# Patient Record
Sex: MALE | Race: WHITE | NOT HISPANIC OR LATINO | Employment: FULL TIME | URBAN - METROPOLITAN AREA
[De-identification: names, ages, dates, MRNs, and addresses within clinical notes are randomized per-mention and may not be internally consistent; named-entity substitution may affect disease eponyms.]

---

## 2019-07-16 ENCOUNTER — OFFICE VISIT (OUTPATIENT)
Dept: URGENT CARE | Facility: MEDICAL CENTER | Age: 39
End: 2019-07-16
Payer: COMMERCIAL

## 2019-07-16 ENCOUNTER — HOSPITAL ENCOUNTER (OUTPATIENT)
Dept: RADIOLOGY | Facility: MEDICAL CENTER | Age: 39
End: 2019-07-16
Attending: FAMILY MEDICINE
Payer: COMMERCIAL

## 2019-07-16 VITALS
HEIGHT: 70 IN | DIASTOLIC BLOOD PRESSURE: 82 MMHG | TEMPERATURE: 98.5 F | SYSTOLIC BLOOD PRESSURE: 126 MMHG | HEART RATE: 87 BPM | WEIGHT: 200 LBS | BODY MASS INDEX: 28.63 KG/M2 | OXYGEN SATURATION: 97 %

## 2019-07-16 DIAGNOSIS — R05.9 COUGH: ICD-10-CM

## 2019-07-16 DIAGNOSIS — J06.9 VIRAL URI WITH COUGH: ICD-10-CM

## 2019-07-16 LAB
FLUAV+FLUBV AG SPEC QL IA: NEGATIVE
INT CON NEG: NORMAL
INT CON POS: NORMAL

## 2019-07-16 PROCEDURE — 99203 OFFICE O/P NEW LOW 30 MIN: CPT | Mod: 25 | Performed by: FAMILY MEDICINE

## 2019-07-16 PROCEDURE — 87804 INFLUENZA ASSAY W/OPTIC: CPT | Performed by: FAMILY MEDICINE

## 2019-07-16 PROCEDURE — 71046 X-RAY EXAM CHEST 2 VIEWS: CPT

## 2019-07-16 PROCEDURE — 94640 AIRWAY INHALATION TREATMENT: CPT | Performed by: FAMILY MEDICINE

## 2019-07-16 PROCEDURE — 93000 ELECTROCARDIOGRAM COMPLETE: CPT | Performed by: FAMILY MEDICINE

## 2019-07-16 RX ORDER — ALBUTEROL SULFATE 90 UG/1
2 AEROSOL, METERED RESPIRATORY (INHALATION) EVERY 4 HOURS PRN
Qty: 1 INHALER | Refills: 0 | Status: SHIPPED | OUTPATIENT
Start: 2019-07-16 | End: 2024-02-16

## 2019-07-16 RX ORDER — ALBUTEROL SULFATE 2.5 MG/3ML
2.5 SOLUTION RESPIRATORY (INHALATION) ONCE
Status: COMPLETED | OUTPATIENT
Start: 2019-07-16 | End: 2019-07-16

## 2019-07-16 RX ADMIN — ALBUTEROL SULFATE 2.5 MG: 2.5 SOLUTION RESPIRATORY (INHALATION) at 09:39

## 2019-07-16 NOTE — PATIENT INSTRUCTIONS
"Use inhaler as needed  Continue symptomatic care  Follow up if not significantly improved as expected, sooner if any worsening or new symptoms      Upper Respiratory Infection, Adult  Most upper respiratory infections (URIs) are caused by a virus. A URI affects the nose, throat, and upper air passages. The most common type of URI is often called \"the common cold.\"  Follow these instructions at home:  · Take medicines only as told by your doctor.  · Gargle warm saltwater or take cough drops to comfort your throat as told by your doctor.  · Use a warm mist humidifier or inhale steam from a shower to increase air moisture. This may make it easier to breathe.  · Drink enough fluid to keep your pee (urine) clear or pale yellow.  · Eat soups and other clear broths.  · Have a healthy diet.  · Rest as needed.  · Go back to work when your fever is gone or your doctor says it is okay.  ¨ You may need to stay home longer to avoid giving your URI to others.  ¨ You can also wear a face mask and wash your hands often to prevent spread of the virus.  · Use your inhaler more if you have asthma.  · Do not use any tobacco products, including cigarettes, chewing tobacco, or electronic cigarettes. If you need help quitting, ask your doctor.  Contact a doctor if:  · You are getting worse, not better.  · Your symptoms are not helped by medicine.  · You have chills.  · You are getting more short of breath.  · You have brown or red mucus.  · You have yellow or brown discharge from your nose.  · You have pain in your face, especially when you bend forward.  · You have a fever.  · You have puffy (swollen) neck glands.  · You have pain while swallowing.  · You have white areas in the back of your throat.  Get help right away if:  · You have very bad or constant:  ¨ Headache.  ¨ Ear pain.  ¨ Pain in your forehead, behind your eyes, and over your cheekbones (sinus pain).  ¨ Chest pain.  · You have long-lasting (chronic) lung disease and any of " the following:  ¨ Wheezing.  ¨ Long-lasting cough.  ¨ Coughing up blood.  ¨ A change in your usual mucus.  · You have a stiff neck.  · You have changes in your:  ¨ Vision.  ¨ Hearing.  ¨ Thinking.  ¨ Mood.  This information is not intended to replace advice given to you by your health care provider. Make sure you discuss any questions you have with your health care provider.  Document Released: 06/05/2009 Document Revised: 08/20/2017 Document Reviewed: 03/25/2015  Elsevier Interactive Patient Education © 2017 [employer]

## 2019-07-16 NOTE — PROGRESS NOTES
"Subjective:      Keo Almodovar is a 38 y.o. male who presents with Chest Pain (x2days full body aches, chest pain on deep inhalation causing cough, SOB, fatigue, heart keeps ponding off and on)            This is a new problem.  38-year-old otherwise healthy non-smoker presenting for 24-hour history of cough pain with deep breathing, fever and chills (fever up to 102 yesterday) at times felt his heart was racing (not today), and feels like he cannot take a deep breath but denies any wheezing.  Denies any chest pain or pressure per se.  No travel history exposure to pneumonia.  Otherwise healthy.  He is a golfer.  Denies any history of CAD.  No recent exposures to ill contacts, pneumonia.  He has taken some Tylenol for his fever.  Currently he has body aches.        Review of Systems   All other systems reviewed and are negative.         Objective:     /82   Pulse 87   Temp 36.9 °C (98.5 °F) (Temporal)   Ht 1.778 m (5' 10\")   Wt 90.7 kg (200 lb)   SpO2 97%   BMI 28.70 kg/m²      Physical Exam   Constitutional: He is oriented to person, place, and time. He appears well-developed and well-nourished.  Non-toxic appearance. No distress.   HENT:   Head: Normocephalic and atraumatic.   Right Ear: Tympanic membrane, external ear and ear canal normal.   Left Ear: Tympanic membrane, external ear and ear canal normal.   Nose: No rhinorrhea.   Mouth/Throat: Oropharynx is clear and moist. No oral lesions. No trismus in the jaw. No uvula swelling. No oropharyngeal exudate, posterior oropharyngeal edema, posterior oropharyngeal erythema or tonsillar abscesses.   Eyes: Conjunctivae are normal.   Neck: Neck supple.   Cardiovascular: Normal rate and regular rhythm.  Exam reveals no gallop and no friction rub.    No murmur heard.  Pulmonary/Chest: Effort normal. No stridor. No respiratory distress. He has no wheezes. He has no rhonchi. He has no rales.   Prolonged expiration but no wheezing or rhonchi heard.  " After the neb patient felt a lot better taking deep breaths   Lymphadenopathy:     He has no cervical adenopathy.   Neurological: He is alert and oriented to person, place, and time.   Skin: Skin is warm. No rash noted. He is not diaphoretic. No erythema. No pallor.   Psychiatric: He has a normal mood and affect.        EKG shows normal sinus rhythm with no acute changes.    Chest x-ray is negative for acute abnormalities on my read.    Rapid flu is negative       Assessment/Plan:     ASSESSMENT:PLAN:  1. Viral URI with cough    2. Cough  - EKG - Clinic Performed  - POCT Influenza A/B  - albuterol (PROVENTIL) 2.5mg/3ml nebulizer solution 2.5 mg; 3 mL by Nebulization route Once.  - DX-CHEST-2 VIEWS; Future  - albuterol 108 (90 Base) MCG/ACT Aero Soln inhalation aerosol; Inhale 2 Puffs by mouth every four hours as needed (wheezing).  Dispense: 1 Inhaler; Refill: 0      We discussed his normal EKG and chest x-ray, also negative flu test  History and physical suggestive of a viral illness and we discussed symptomatic treatment.  He did feel significantly better after the albuterol nebulizer he was given.  We discussed using albuterol inhaler every 4 hours as needed.  Resting and hydrating and over-the-counter medication for fever and cough discussed.  Plan per orders and instructions  Warning signs reviewed

## 2019-08-06 ENCOUNTER — TELEPHONE (OUTPATIENT)
Dept: MEDICAL GROUP | Facility: MEDICAL CENTER | Age: 39
End: 2019-08-06

## 2019-08-06 NOTE — TELEPHONE ENCOUNTER
Patient left a voicemail wondering if he should have a follow up appt. I left him a voicemail to call and schedule an appt if he is still having symptoms at [phone].

## 2021-07-30 ENCOUNTER — APPOINTMENT (RX ONLY)
Dept: URBAN - METROPOLITAN AREA CLINIC 31 | Facility: CLINIC | Age: 41
Setting detail: DERMATOLOGY
End: 2021-07-30

## 2021-07-30 DIAGNOSIS — L65.9 NONSCARRING HAIR LOSS, UNSPECIFIED: ICD-10-CM

## 2021-07-30 DIAGNOSIS — Z71.89 OTHER SPECIFIED COUNSELING: ICD-10-CM

## 2021-07-30 PROCEDURE — ? PRESCRIPTION

## 2021-07-30 PROCEDURE — 99202 OFFICE O/P NEW SF 15 MIN: CPT

## 2021-07-30 PROCEDURE — ? ADDITIONAL NOTES

## 2021-07-30 PROCEDURE — ? COUNSELING

## 2021-07-30 RX ORDER — FINASTERIDE 1 MG/1
TABLET, FILM COATED ORAL
Qty: 30 | Refills: 11 | Status: ERX | COMMUNITY
Start: 2021-07-30

## 2021-07-30 RX ADMIN — FINASTERIDE 1: 1 TABLET, FILM COATED ORAL at 00:00

## 2021-07-30 ASSESSMENT — LOCATION ZONE DERM: LOCATION ZONE: SCALP

## 2021-07-30 ASSESSMENT — LOCATION SIMPLE DESCRIPTION DERM
LOCATION SIMPLE: SCALP
LOCATION SIMPLE: RIGHT SCALP

## 2021-07-30 ASSESSMENT — LOCATION DETAILED DESCRIPTION DERM
LOCATION DETAILED: LEFT CENTRAL PARIETAL SCALP
LOCATION DETAILED: RIGHT CENTRAL FRONTAL SCALP

## 2021-07-30 NOTE — HPI: RASH
What Type Of Note Output Would You Prefer (Optional)?: Standard Output
Is This A New Presentation, Or A Follow-Up?: Rash
Additional History: Patient was being treated by previous dermatologist for rash. Which rash is well controlled now

## 2021-07-30 NOTE — PROCEDURE: ADDITIONAL NOTES
Additional Notes: Patient came in today for skin care products. \\nUse moisturiser with SPF of 30 or greater daily.\\nAdvise patient to use La Roche Posay Anthelios for face and neck sunblock Coppertone Waterbabies for body.\\nRecommend patient to use TSAL shampoo to wash face, as salicylic acid has been helpful for his acne.
Detail Level: Simple
Render Risk Assessment In Note?: no

## 2022-10-12 ENCOUNTER — APPOINTMENT (RX ONLY)
Dept: URBAN - METROPOLITAN AREA CLINIC 31 | Facility: CLINIC | Age: 42
Setting detail: DERMATOLOGY
End: 2022-10-12

## 2022-10-12 DIAGNOSIS — L259 CONTACT DERMATITIS AND OTHER ECZEMA, UNSPECIFIED CAUSE: ICD-10-CM

## 2022-10-12 DIAGNOSIS — L65.9 NONSCARRING HAIR LOSS, UNSPECIFIED: ICD-10-CM

## 2022-10-12 PROBLEM — L23.9 ALLERGIC CONTACT DERMATITIS, UNSPECIFIED CAUSE: Status: ACTIVE | Noted: 2022-10-12

## 2022-10-12 PROCEDURE — 99213 OFFICE O/P EST LOW 20 MIN: CPT

## 2022-10-12 PROCEDURE — ? COUNSELING

## 2022-10-12 PROCEDURE — ? PRESCRIPTION

## 2022-10-12 PROCEDURE — ? ORDER FOR PATCH TESTING

## 2022-10-12 RX ORDER — TRIAMCINOLONE ACETONIDE 1 MG/G
CREAM TOPICAL BID
Qty: 80 | Refills: 3 | Status: ERX | COMMUNITY
Start: 2022-10-12

## 2022-10-12 RX ORDER — HYDROCORTISONE 25 MG/G
CREAM TOPICAL BID
Qty: 28 | Refills: 3 | Status: ERX | COMMUNITY
Start: 2022-10-12

## 2022-10-12 RX ORDER — FINASTERIDE 1 MG/1
TABLET, FILM COATED ORAL
Qty: 30 | Refills: 11 | Status: ERX

## 2022-10-12 RX ADMIN — HYDROCORTISONE 1: 25 CREAM TOPICAL at 00:00

## 2022-10-12 RX ADMIN — TRIAMCINOLONE ACETONIDE 1: 1 CREAM TOPICAL at 00:00

## 2022-10-12 ASSESSMENT — LOCATION SIMPLE DESCRIPTION DERM
LOCATION SIMPLE: LEFT ANTERIOR NECK
LOCATION SIMPLE: LEFT UPPER ARM
LOCATION SIMPLE: RIGHT UPPER ARM
LOCATION SIMPLE: LEFT CHEEK
LOCATION SIMPLE: RIGHT CHEEK
LOCATION SIMPLE: RIGHT SCALP
LOCATION SIMPLE: SCALP

## 2022-10-12 ASSESSMENT — LOCATION DETAILED DESCRIPTION DERM
LOCATION DETAILED: LEFT CENTRAL PARIETAL SCALP
LOCATION DETAILED: LEFT ANTERIOR PROXIMAL UPPER ARM
LOCATION DETAILED: RIGHT CENTRAL MALAR CHEEK
LOCATION DETAILED: LEFT CENTRAL MALAR CHEEK
LOCATION DETAILED: RIGHT CENTRAL FRONTAL SCALP
LOCATION DETAILED: LEFT INFERIOR ANTERIOR NECK
LOCATION DETAILED: RIGHT ANTERIOR PROXIMAL UPPER ARM

## 2022-10-12 ASSESSMENT — LOCATION ZONE DERM
LOCATION ZONE: ARM
LOCATION ZONE: NECK
LOCATION ZONE: SCALP
LOCATION ZONE: FACE

## 2022-10-12 NOTE — HPI: RASH
Is The Patient Presenting As Previously Scheduled?: Yes
How Severe Is Your Rash?: moderate
Is This A New Presentation, Or A Follow-Up?: Rash
Additional History: Pt has rash about 3 times per year, always in these locations

## 2022-10-12 NOTE — PROCEDURE: ORDER FOR PATCH TESTING
Location Patches Should Be Applied: Back
Counseling: I discussed the timing of the procedure and ensured the patient understands that this test requires multiple visits. No topical steroids applied should be applied to the patch testing location and no oral prednisone for two week prior to the test. While the patches are in place they should be kept dry which will limit bathing, swimming an exercise. I also explained that it is common for testing to be negative and this doesn't mean there isn't a allergic reaction occurring. During the testing itching is common.
Patch Test Reading Schedule: First Reading at 48 hours, Second Reading at 72 hours and Third Reading on Day 7
Detail Level: Simple
Patch Test To Be Applied: [employer] 80 Series

## 2022-11-15 ENCOUNTER — APPOINTMENT (RX ONLY)
Dept: URBAN - METROPOLITAN AREA CLINIC 4 | Facility: CLINIC | Age: 42
Setting detail: DERMATOLOGY
End: 2022-11-15

## 2022-11-15 DIAGNOSIS — L259 CONTACT DERMATITIS AND OTHER ECZEMA, UNSPECIFIED CAUSE: ICD-10-CM

## 2022-11-15 PROBLEM — L30.9 DERMATITIS, UNSPECIFIED: Status: ACTIVE | Noted: 2022-11-15

## 2022-11-15 PROCEDURE — ? PATCH TESTING

## 2022-11-15 PROCEDURE — 95044 PATCH/APPLICATION TESTS: CPT

## 2022-11-15 ASSESSMENT — LOCATION SIMPLE DESCRIPTION DERM: LOCATION SIMPLE: UPPER BACK

## 2022-11-15 ASSESSMENT — LOCATION DETAILED DESCRIPTION DERM: LOCATION DETAILED: INFERIOR THORACIC SPINE

## 2022-11-15 ASSESSMENT — LOCATION ZONE DERM: LOCATION ZONE: TRUNK

## 2022-11-15 NOTE — HPI: TESTING (PATCH TESTING)
Has Your Rash Been Biopsied Before?: has not been biopsied previously
Have You Had Previous Patch Testing In The Past?: none
How Severe Is Your Rash At Its Worst?: moderate
Additional History: History of food allergies, sensitivities to deodorant, fragrances, anti wrinkle treatments.
Who Is Your Referring Doctor?: Rahul Marquez
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

## 2022-11-15 NOTE — PROCEDURE: PATCH TESTING
Consent: Verbal consent obtained, risks reviewed including but not limited to rash, itching, allergic reaction, systemic rash, remote possibility of anaphylaxis to allergen.
Post-Care Instructions: I reviewed with the patient in detail post-care instructions. Patient should not sweat, pick at, or get the patches wet for 48 hours.
Detail Level: Zone
Number Of Patches (Maximum Allowable Per Dos By Cms Is 90): 80

## 2022-11-17 ENCOUNTER — APPOINTMENT (RX ONLY)
Dept: URBAN - METROPOLITAN AREA CLINIC 4 | Facility: CLINIC | Age: 42
Setting detail: DERMATOLOGY
End: 2022-11-17

## 2022-11-17 DIAGNOSIS — L259 CONTACT DERMATITIS AND OTHER ECZEMA, UNSPECIFIED CAUSE: ICD-10-CM

## 2022-11-17 PROBLEM — L30.9 DERMATITIS, UNSPECIFIED: Status: ACTIVE | Noted: 2022-11-17

## 2022-11-17 PROCEDURE — ? CORE ACDS PATCH TEST READING

## 2022-11-17 PROCEDURE — 99212 OFFICE O/P EST SF 10 MIN: CPT

## 2022-11-17 NOTE — PROCEDURE: CORE ACDS PATCH TEST READING
Name Of Allergen 69: cetylstearyalcohol
Name Of Allergen 66: dimethylol dihydroxyethyleneurea
Name Of Allergen 5: DMDM hydantoin
Allergen 30 Reaction: no reaction
Show Negative Results In The Note?: Yes
Name Of Allergen 17: methylchloroisothiazolinone/methylisothiazolinone
Name Of Allergen 21: formaldehyde
Name Of Allergen 15: carba mix
Name Of Allergen 36: lidocaine-hci
Name Of Allergen 14: bisphenol A epoxy resin
Name Of Allergen 55: 2-hydroxy-4-methoxybenzophenone
Name Of Allergen 48: cinnamic aldehyde
Detail Level: Zone
Name Of Allergen 42: dimethylaminopropylamine
Name Of Allergen 4: potassium dichromate
Name Of Allergen 34: fragrance mix 2
Name Of Allergen 35: disperse blue mix 124/106
Name Of Allergen 8: paraben mix B
Name Of Allergen 53: propolis
Name Of Allergen 26: benzocaine
Name Of Allergen 47: lavandula angustifolia oil
Name Of Allergen 38: iodopropyynyl betaine
Name Of Allergen 1: nickel sulfate hexahydrate
Name Of Allergen 24: thiuram mix A
Name Of Allergen 18: quaternium 15
Name Of Allergen 40: cocamidopropyl betaine
Name Of Allergen 16: black rubber mix
Name Of Allergen 7: colophony
Name Of Allergen 43: 2-hydroxyethyl-methacrylate
Name Of Allergen 30: budesonide
Name Of Allergen 76: disperse orange-3
Allergen 48 Reaction: 1+
Name Of Allergen 70: ethyl hexyl glycerol
What Reading Time Point?: 48 hour
Name Of Allergen 62: sodium benzoate
Name Of Allergen 73: amidoamine
Name Of Allergen 46: methyl methacrylate
Name Of Allergen 59: 4-chloro-3-cresol
Name Of Allergen 64: cananga odorata
Name Of Allergen 58: cocunut diethanolamide
Name Of Allergen 2: amerchol L101
Name Of Allergen 67: sorbitan sesquioleate
Name Of Allergen 6: fragrance mix
Name Of Allergen 60: benzalkonium chloride
Name Of Allergen 39: polymyxin B sulfate
Name Of Allergen 80: benzyl alcohol
Name Of Allergen 54: 4-chloro-3,5-xylenol
Name Of Allergen 41: mixed dialkyl thioureas
Name Of Allergen 13: petrolatum-tert- butylphenol formaldehyde resin
Name Of Allergen 20: petrolatum-phenylenediamine
Name Of Allergen 57: sesquiterpenelactone mix
Name Of Allergen 56: tosylamide formaldehyde resin
Name Of Allergen 28: gold sodium thiosulfate
Name Of Allergen 74: ethyl cyanoacrylate
Name Of Allergen 11: ethylenediamine dihydrochloride
Name Of Allergen 68: 1,3-diphenylguanidine
Name Of Allergen 51: tea tree oil, oxidized
Name Of Allergen 52: chlorhexidine diclugonate
Name Of Allergen 75: phenoxyethanol
Name Of Allergen 19: methyldibromo glutaronitrile
Name Of Allergen 65: compositae mix
Name Of Allergen 61: benzophenone chloride
Name Of Allergen 49: dl alpha tocopherol
Name Of Allergen 32: 2-mercaptobenzothiazole
Name Of Allergen 29: imidazolidinyl urea
Name Of Allergen 3: neomycin sulfate
Name Of Allergen 71: triamcinalone acetonide
Name Of Allergen 79: 2-ethylhexyl-4-methoxycinnamate
Name Of Allergen 27: tixocortol-21-pivalate
Name Of Allergen 25: diazolidinyl urea
Name Of Allergen 22: mercapto mix A
Name Of Allergen 77: benzoic acid
Name Of Allergen 44: oleamidopropyl dimethylamine
Name Of Allergen 31: hydrocortisone-17-butyrate
Name Of Allergen 33: bacitracin
Name Of Allergen 72: clobetasol-17-propionate
Name Of Allergen 78: butylhydroxytoluene
Name Of Allergen 23: 2-bromo-2-nitropropane-1,3-diol
Name Of Allergen 45: decyl glucoside
Name Of Allergen 10: balsam of peru
Number Of Patches Read: 80
Name Of Allergen 12: cobalt 2 chloride hexahydrate
Name Of Allergen 9: methylisothiazolinone
Name Of Allergen 50: ethyl acrylate
Name Of Allergen 37: propylene glycol
Name Of Allergen 63: sorbic acid

## 2022-11-22 ENCOUNTER — APPOINTMENT (RX ONLY)
Dept: URBAN - METROPOLITAN AREA CLINIC 4 | Facility: CLINIC | Age: 42
Setting detail: DERMATOLOGY
End: 2022-11-22

## 2022-11-22 DIAGNOSIS — L259 CONTACT DERMATITIS AND OTHER ECZEMA, UNSPECIFIED CAUSE: ICD-10-CM

## 2022-11-22 PROBLEM — L30.9 DERMATITIS, UNSPECIFIED: Status: ACTIVE | Noted: 2022-11-22

## 2022-11-22 PROCEDURE — 99212 OFFICE O/P EST SF 10 MIN: CPT

## 2022-11-22 PROCEDURE — ? CORE ACDS PATCH TEST READING

## 2022-11-22 PROCEDURE — ? ADDITIONAL NOTES

## 2022-11-22 NOTE — HPI: TESTING (PATCH TESTING READING, DISCUSSION OF RESULTS)
How Severe Is Your Rash?: moderate
What Patch Testing Have You Undergone?: Core ACDS Recommended Series
What Reading Is This?: 168 hour patch test reading

## 2022-11-22 NOTE — PROCEDURE: ADDITIONAL NOTES
Additional Notes: Completed patch testing on this patient who has had a rash variably involving the neck and arms.  When we saw him, he had a rash predominantly involving the anterior and lateral sides of the neck.\\n\\nHe had 3 positive reactions.  \\n Allergens Showing 3+ Reactions:  propolis\\nAllergens Showing 2+ Reactions:\\n1. paraben mix B\\n2. methylchloroisothiazolinone/methylisothiazolinone\\n\\n\\Pat of these allergens may be relevant to his rash.  He has the allergen data sheets and the [employer] irineo which should help him avoid purchasing products that contain these allergens.\\n\\n\\nHe is scheduled to follow-up with the referring dermatologist, Dr. Camilo, in 2 months.  Hopefully by that time he will notice significant improvement due to effective allergen avoidance.
Detail Level: Simple
Render Risk Assessment In Note?: yes

## 2022-11-22 NOTE — PROCEDURE: CORE ACDS PATCH TEST READING
Allergen 96 Reaction: no reaction
Name Of Allergen 36: lidocaine-hci
Name Of Allergen 46: methyl methacrylate
Name Of Allergen 4: potassium dichromate
What Reading Time Point?: 168 hour
Name Of Allergen 53: propolis
Name Of Allergen 55: 2-hydroxy-4-methoxybenzophenone
Name Of Allergen 49: dl alpha tocopherol
Name Of Allergen 51: tea tree oil, oxidized
Name Of Allergen 68: 1,3-diphenylguanidine
Name Of Allergen 57: sesquiterpenelactone mix
Name Of Allergen 17: methylchloroisothiazolinone/methylisothiazolinone
Show Allergen Counseling In The Note?: Yes
Name Of Allergen 2: amerchol L101
Allergen 8 Reaction: 2+
Name Of Allergen 44: oleamidopropyl dimethylamine
Name Of Allergen 21: formaldehyde
Name Of Allergen 18: quaternium 15
Name Of Allergen 71: triamcinalone acetonide
Name Of Allergen 58: cocunut diethanolamide
Name Of Allergen 25: diazolidinyl urea
Name Of Allergen 41: mixed dialkyl thioureas
Name Of Allergen 61: benzophenone chloride
Name Of Allergen 70: ethyl hexyl glycerol
Name Of Allergen 40: cocamidopropyl betaine
Name Of Allergen 72: clobetasol-17-propionate
Name Of Allergen 9: methylisothiazolinone
Name Of Allergen 10: balsam of peru
Name Of Allergen 33: bacitracin
Name Of Allergen 73: amidoamine
Name Of Allergen 6: fragrance mix
Name Of Allergen 32: 2-mercaptobenzothiazole
Name Of Allergen 37: propylene glycol
Name Of Allergen 63: sorbic acid
Name Of Allergen 34: fragrance mix 2
Name Of Allergen 79: 2-ethylhexyl-4-methoxycinnamate
Name Of Allergen 43: 2-hydroxyethyl-methacrylate
Name Of Allergen 48: cinnamic aldehyde
Name Of Allergen 42: dimethylaminopropylamine
Number Of Patches Read: 80
Name Of Allergen 45: decyl glucoside
Name Of Allergen 59: 4-chloro-3-cresol
Name Of Allergen 69: cetylstearyalcohol
Name Of Allergen 14: bisphenol A epoxy resin
Name Of Allergen 15: carba mix
Name Of Allergen 20: petrolatum-phenylenediamine
Name Of Allergen 74: ethyl cyanoacrylate
Name Of Allergen 64: cananga odorata
Name Of Allergen 8: paraben mix B
Name Of Allergen 23: 2-bromo-2-nitropropane-1,3-diol
Name Of Allergen 11: ethylenediamine dihydrochloride
Name Of Allergen 60: benzalkonium chloride
Name Of Allergen 80: benzyl alcohol
Name Of Allergen 13: petrolatum-tert- butylphenol formaldehyde resin
Name Of Allergen 29: imidazolidinyl urea
Name Of Allergen 1: nickel sulfate hexahydrate
Name Of Allergen 66: dimethylol dihydroxyethyleneurea
Allergen 53 Reaction: 3+
Name Of Allergen 24: thiuram mix A
Name Of Allergen 75: phenoxyethanol
Name Of Allergen 77: benzoic acid
Name Of Allergen 31: hydrocortisone-17-butyrate
Name Of Allergen 39: polymyxin B sulfate
Name Of Allergen 16: black rubber mix
Name Of Allergen 19: methyldibromo glutaronitrile
Name Of Allergen 62: sodium benzoate
Name Of Allergen 7: colophony
Name Of Allergen 65: compositae mix
Name Of Allergen 12: cobalt 2 chloride hexahydrate
Name Of Allergen 30: budesonide
Name Of Allergen 56: tosylamide formaldehyde resin
Name Of Allergen 26: benzocaine
Name Of Allergen 78: butylhydroxytoluene
Name Of Allergen 47: lavandula angustifolia oil
Detail Level: Zone
Name Of Allergen 52: chlorhexidine diclugonate
Name Of Allergen 5: DMDM hydantoin
Name Of Allergen 28: gold sodium thiosulfate
Name Of Allergen 54: 4-chloro-3,5-xylenol
Name Of Allergen 76: disperse orange-3
Name Of Allergen 27: tixocortol-21-pivalate
Name Of Allergen 22: mercapto mix A
Name Of Allergen 67: sorbitan sesquioleate
Name Of Allergen 35: disperse blue mix 124/106
Name Of Allergen 50: ethyl acrylate
Name Of Allergen 38: iodopropyynyl betaine
Name Of Allergen 3: neomycin sulfate

## 2023-12-19 ENCOUNTER — RX ONLY (OUTPATIENT)
Age: 43
Setting detail: RX ONLY
End: 2023-12-19

## 2023-12-19 RX ORDER — FINASTERIDE 1 MG/1
TABLET, FILM COATED ORAL
Qty: 30 | Refills: 0 | Status: ERX

## 2024-01-25 ENCOUNTER — APPOINTMENT (RX ONLY)
Dept: URBAN - METROPOLITAN AREA CLINIC 4 | Facility: CLINIC | Age: 44
Setting detail: DERMATOLOGY
End: 2024-01-25

## 2024-01-25 DIAGNOSIS — B36.0 PITYRIASIS VERSICOLOR: ICD-10-CM

## 2024-01-25 DIAGNOSIS — L259 CONTACT DERMATITIS AND OTHER ECZEMA, UNSPECIFIED CAUSE: ICD-10-CM | Status: INADEQUATELY CONTROLLED

## 2024-01-25 DIAGNOSIS — L65.9 NONSCARRING HAIR LOSS, UNSPECIFIED: ICD-10-CM

## 2024-01-25 PROBLEM — L23.9 ALLERGIC CONTACT DERMATITIS, UNSPECIFIED CAUSE: Status: ACTIVE | Noted: 2024-01-25

## 2024-01-25 PROBLEM — L30.8 OTHER SPECIFIED DERMATITIS: Status: ACTIVE | Noted: 2024-01-25

## 2024-01-25 PROCEDURE — ? ADDITIONAL NOTES

## 2024-01-25 PROCEDURE — 99214 OFFICE O/P EST MOD 30 MIN: CPT

## 2024-01-25 PROCEDURE — ? KOH PREP

## 2024-01-25 PROCEDURE — ? DIAGNOSIS COMMENT

## 2024-01-25 PROCEDURE — ? WOOD'S LAMP

## 2024-01-25 PROCEDURE — ? PRESCRIPTION

## 2024-01-25 PROCEDURE — ? COUNSELING

## 2024-01-25 RX ORDER — FINASTERIDE 1 MG/1
TABLET, FILM COATED ORAL
Qty: 30 | Refills: 11 | Status: ERX

## 2024-01-25 ASSESSMENT — LOCATION ZONE DERM
LOCATION ZONE: ARM
LOCATION ZONE: SCALP
LOCATION ZONE: FACE
LOCATION ZONE: TRUNK

## 2024-01-25 ASSESSMENT — LOCATION SIMPLE DESCRIPTION DERM
LOCATION SIMPLE: SCALP
LOCATION SIMPLE: GROIN
LOCATION SIMPLE: RIGHT UPPER ARM
LOCATION SIMPLE: RIGHT SCALP
LOCATION SIMPLE: UPPER BACK
LOCATION SIMPLE: ABDOMEN
LOCATION SIMPLE: SUPERIOR FOREHEAD
LOCATION SIMPLE: CHEST

## 2024-01-25 ASSESSMENT — LOCATION DETAILED DESCRIPTION DERM
LOCATION DETAILED: EPIGASTRIC SKIN
LOCATION DETAILED: STERNUM
LOCATION DETAILED: RIGHT INGUINAL CREASE
LOCATION DETAILED: RIGHT CENTRAL FRONTAL SCALP
LOCATION DETAILED: LEFT CENTRAL PARIETAL SCALP
LOCATION DETAILED: LEFT INGUINAL CREASE
LOCATION DETAILED: SUPERIOR MID FOREHEAD
LOCATION DETAILED: RIGHT PROXIMAL POSTERIOR UPPER ARM
LOCATION DETAILED: SUPERIOR THORACIC SPINE

## 2024-01-25 NOTE — PROCEDURE: WOOD'S LAMP
Detail Level: Simple
Wood's Lamp Text: A Wood's Lamp was used to illuminate areas of the skin with a black light. The light was held over the suspected areas in a darkened room.
Response To Light: positive for enhanced pigmentation

## 2024-01-25 NOTE — PROCEDURE: ADDITIONAL NOTES
Render Risk Assessment In Note?: no
Detail Level: Simple
Additional Notes: [facility] Patch Testing Nov 2022: \\nAllergens Showing 3+ Reactions:  propolis\\nAllergens Showing 2+ Reactions: paraben mix B, methylchloroisothiazolinone / methylisothiazolinone

## 2024-01-25 NOTE — HPI: RASH
How Severe Is Your Rash?: moderate
Is This A New Presentation, Or A Follow-Up?: Follow Up Rash
Additional History: Patient has confirmed allergic contact allergens that he hasn’t been monitoring in his products that he uses.

## 2024-01-26 ENCOUNTER — RX ONLY (OUTPATIENT)
Age: 44
Setting detail: RX ONLY
End: 2024-01-26

## 2024-01-26 RX ORDER — FINASTERIDE 1 MG/1
1 TABLET, FILM COATED ORAL QD
Qty: 90 | Refills: 3 | Status: ERX

## 2024-01-26 RX ORDER — DESONIDE 0.5 MG/G
1 OINTMENT TOPICAL BID
Qty: 60 | Refills: 1 | Status: ERX | COMMUNITY
Start: 2024-01-26

## 2024-01-26 RX ORDER — KETOCONAZOLE 20 MG/ML
1 SHAMPOO, SUSPENSION TOPICAL BIW
Qty: 120 | Refills: 3 | Status: ERX | COMMUNITY
Start: 2024-01-26

## 2024-01-26 RX ORDER — TRIAMCINOLONE ACETONIDE 1 MG/G
1 CREAM TOPICAL BID
Qty: 80 | Refills: 0 | Status: ERX

## 2024-01-26 RX ADMIN — DESONIDE 1: 0.5 OINTMENT TOPICAL at 00:00

## 2024-01-26 RX ADMIN — KETOCONAZOLE 1: 20 SHAMPOO, SUSPENSION TOPICAL at 00:00

## 2024-02-16 ENCOUNTER — OFFICE VISIT (OUTPATIENT)
Dept: URGENT CARE | Facility: CLINIC | Age: 44
End: 2024-02-16
Payer: COMMERCIAL

## 2024-02-16 VITALS
TEMPERATURE: 97.8 F | WEIGHT: 200 LBS | HEIGHT: 70 IN | HEART RATE: 80 BPM | SYSTOLIC BLOOD PRESSURE: 118 MMHG | OXYGEN SATURATION: 97 % | BODY MASS INDEX: 28.63 KG/M2 | RESPIRATION RATE: 16 BRPM | DIASTOLIC BLOOD PRESSURE: 64 MMHG

## 2024-02-16 DIAGNOSIS — H10.33 ACUTE CONJUNCTIVITIS OF BOTH EYES, UNSPECIFIED ACUTE CONJUNCTIVITIS TYPE: ICD-10-CM

## 2024-02-16 DIAGNOSIS — J06.9 URI WITH COUGH AND CONGESTION: ICD-10-CM

## 2024-02-16 PROCEDURE — 99203 OFFICE O/P NEW LOW 30 MIN: CPT | Performed by: NURSE PRACTITIONER

## 2024-02-16 PROCEDURE — 3074F SYST BP LT 130 MM HG: CPT | Performed by: NURSE PRACTITIONER

## 2024-02-16 PROCEDURE — 3078F DIAST BP <80 MM HG: CPT | Performed by: NURSE PRACTITIONER

## 2024-02-16 RX ORDER — FINASTERIDE 1 MG/1
1 TABLET, FILM COATED ORAL
COMMUNITY

## 2024-02-16 RX ORDER — POLYMYXIN B SULFATE AND TRIMETHOPRIM 1; 10000 MG/ML; [USP'U]/ML
1 SOLUTION OPHTHALMIC EVERY 4 HOURS
Qty: 10 ML | Refills: 0 | Status: SHIPPED | OUTPATIENT
Start: 2024-02-16 | End: 2024-02-23

## 2024-02-16 RX ORDER — LISINOPRIL 10 MG/1
TABLET ORAL
COMMUNITY
Start: 2024-01-18

## 2024-02-16 ASSESSMENT — ENCOUNTER SYMPTOMS
BLURRED VISION: 0
COUGH: 1
EYE DISCHARGE: 1
EYE PAIN: 0
SHORTNESS OF BREATH: 0
EYE ITCHING: 0
SORE THROAT: 1
CONSTITUTIONAL NEGATIVE: 1
FEVER: 0
EYE REDNESS: 1
CHILLS: 0

## 2024-02-16 ASSESSMENT — VISUAL ACUITY: OU: 1

## 2024-02-16 NOTE — PROGRESS NOTES
Subjective:     Keo Almodovar is a 43 y.o. male who presents for Pharyngitis (Sx x a week ), Cough (Sx x a week ago ), and Conjunctivitis (Sx x this morning)       Pharyngitis   This is a new problem. The problem has been unchanged. Associated symptoms include congestion and coughing. Pertinent negatives include no shortness of breath.   Cough  This is a new problem. The problem has been unchanged. Associated symptoms include eye redness and a sore throat. Pertinent negatives include no chills, fever or shortness of breath.   Eye Problem   Both eyes are affected. This is a new problem. The current episode started today. The problem has been gradually worsening. There was no injury mechanism. The patient is experiencing no pain. Associated symptoms include an eye discharge (Crusty) and eye redness. Pertinent negatives include no blurred vision, fever or itching. He has tried nothing for the symptoms.     Patient reports cough, congestion, and sore throat for 1 week.  Today, woke up with bilateral crusty eye discharge.  Eyes are also red.  Denies injury.    Reports wife had similar symptoms a month ago.  Was treated with antibiotic eyedrops and symptoms resolved.    Kids go to  and have history of pink eye. No sx at this time.    Review of Systems   Constitutional: Negative.  Negative for chills, fever and malaise/fatigue.   HENT:  Positive for congestion and sore throat.    Eyes:  Positive for discharge (Crusty) and redness. Negative for blurred vision, pain and itching.   Respiratory:  Positive for cough. Negative for shortness of breath.    All other systems reviewed and are negative.    Refer to HPI for additional details.    During this visit, appropriate PPE was worn, and hand hygiene was performed.    PMH:  has no past medical history on file.    MEDS:   Current Outpatient Medications:     lisinopril (PRINIVIL) 10 MG Tab, , Disp: , Rfl:     finasteride (PROPECIA) 1 MG tablet, Take 1 mg by  "mouth every day., Disp: , Rfl:     polymixin-trimethoprim (POLYTRIM) 97899-4.1 UNIT/ML-% Solution, Administer 1 Drop into both eyes every 4 hours for 7 days., Disp: 10 mL, Rfl: 0    ALLERGIES:   Allergies   Allergen Reactions    [place] (Diagnostic)     Eggs     Peanut (Diagnostic)      SURGHX: History reviewed. No pertinent surgical history.  SOCHX:  reports that he has never smoked. He has never used smokeless tobacco. He reports current alcohol use.    FH: Per HPI as applicable/pertinent.      Objective:     /64 (BP Location: Left arm, Patient Position: Sitting, BP Cuff Size: Large adult)   Pulse 80   Temp 36.6 °C (97.8 °F) (Temporal)   Resp 16   Ht 1.778 m (5' 10\")   Wt 90.7 kg (200 lb)   SpO2 97%   BMI 28.70 kg/m²     Physical Exam  Nursing note reviewed.   Constitutional:       General: He is not in acute distress.     Appearance: He is well-developed. He is not ill-appearing or toxic-appearing.   HENT:      Nose: Congestion present.      Mouth/Throat:      Mouth: Mucous membranes are moist.      Comments: PND  Eyes:      General: Vision grossly intact.         Right eye: No discharge.         Left eye: No discharge.      Extraocular Movements: Extraocular movements intact.      Conjunctiva/sclera:      Right eye: Right conjunctiva is injected.      Left eye: Left conjunctiva is injected.      Pupils: Pupils are equal, round, and reactive to light.   Neck:      Trachea: Phonation normal.   Cardiovascular:      Rate and Rhythm: Normal rate and regular rhythm.      Heart sounds: Normal heart sounds.   Pulmonary:      Effort: Pulmonary effort is normal. No respiratory distress.      Breath sounds: Normal breath sounds. No stridor. No decreased breath sounds, wheezing, rhonchi or rales.   Musculoskeletal:         General: No deformity. Normal range of motion.      Cervical back: Normal range of motion.   Skin:     General: Skin is warm and dry.      Coloration: Skin is not pale.   Neurological:      " Mental Status: He is alert and oriented to person, place, and time.      Motor: No weakness.   Psychiatric:         Behavior: Behavior normal. Behavior is cooperative.       Assessment/Plan:     1. Acute conjunctivitis of both eyes, unspecified acute conjunctivitis type  - polymixin-trimethoprim (POLYTRIM) 10779-4.1 UNIT/ML-% Solution; Administer 1 Drop into both eyes every 4 hours for 7 days.  Dispense: 10 mL; Refill: 0    2. URI with cough and congestion    Discussed likely concurrent self-limiting viral etiology and expected course and duration of illness. Vital signs stable, afebrile, no acute distress at this time.    Concern for new onset bacterial conjunctivitis.  Rx as above sent electronically.  Advised of infectious nature of conjunctivitis. Avoid rubbing eyes. Perform frequent hand hygiene.     Emphasize supportive measures and symptom management with over-the-counter medication as needed such as DayQuil/NyQuil.    Standard precautions/mask/wash hands.    Monitor. Return precautions advised.     Differential diagnosis, natural history, supportive care, over-the-counter symptom management per 's instructions, close monitoring, and indications for immediate follow-up discussed.     All questions answered. Patient agrees with the plan of care.    Discharge summary provided via [employer].

## 2024-02-21 ENCOUNTER — OFFICE VISIT (OUTPATIENT)
Dept: URGENT CARE | Facility: CLINIC | Age: 44
End: 2024-02-21
Payer: COMMERCIAL

## 2024-02-21 VITALS
TEMPERATURE: 97.8 F | BODY MASS INDEX: 27.92 KG/M2 | OXYGEN SATURATION: 95 % | WEIGHT: 195 LBS | DIASTOLIC BLOOD PRESSURE: 70 MMHG | HEART RATE: 68 BPM | RESPIRATION RATE: 16 BRPM | HEIGHT: 70 IN | SYSTOLIC BLOOD PRESSURE: 128 MMHG

## 2024-02-21 DIAGNOSIS — J32.9 BACTERIAL SINUSITIS: ICD-10-CM

## 2024-02-21 DIAGNOSIS — B96.89 BACTERIAL SINUSITIS: ICD-10-CM

## 2024-02-21 DIAGNOSIS — R05.9 COUGH, UNSPECIFIED TYPE: ICD-10-CM

## 2024-02-21 PROCEDURE — 3078F DIAST BP <80 MM HG: CPT | Performed by: PHYSICIAN ASSISTANT

## 2024-02-21 PROCEDURE — 3074F SYST BP LT 130 MM HG: CPT | Performed by: PHYSICIAN ASSISTANT

## 2024-02-21 PROCEDURE — 99213 OFFICE O/P EST LOW 20 MIN: CPT | Performed by: PHYSICIAN ASSISTANT

## 2024-02-21 RX ORDER — AMOXICILLIN AND CLAVULANATE POTASSIUM 875; 125 MG/1; MG/1
1 TABLET, FILM COATED ORAL 2 TIMES DAILY
Qty: 14 TABLET | Refills: 0 | Status: SHIPPED | OUTPATIENT
Start: 2024-02-21 | End: 2024-02-28

## 2024-02-21 RX ORDER — BENZONATATE 100 MG/1
100 CAPSULE ORAL 3 TIMES DAILY PRN
Qty: 30 CAPSULE | Refills: 0 | Status: SHIPPED | OUTPATIENT
Start: 2024-02-21 | End: 2024-03-02

## 2024-02-21 RX ORDER — PREDNISONE 10 MG/1
40 TABLET ORAL DAILY
Qty: 20 TABLET | Refills: 0 | Status: SHIPPED | OUTPATIENT
Start: 2024-02-21 | End: 2024-02-26

## 2024-02-21 RX ORDER — DEXTROMETHORPHAN HYDROBROMIDE AND PROMETHAZINE HYDROCHLORIDE 15; 6.25 MG/5ML; MG/5ML
5 SYRUP ORAL EVERY 6 HOURS PRN
Qty: 118 ML | Refills: 0 | Status: SHIPPED | OUTPATIENT
Start: 2024-02-21 | End: 2024-02-28

## 2024-02-21 ASSESSMENT — ENCOUNTER SYMPTOMS
MYALGIAS: 0
NAUSEA: 0
SPUTUM PRODUCTION: 1
ABDOMINAL PAIN: 0
SINUS PAIN: 1
FEVER: 0
CONSTIPATION: 0
SHORTNESS OF BREATH: 0
COUGH: 1
SORE THROAT: 1
HEADACHES: 0
EYE PAIN: 0
CHILLS: 0
DIARRHEA: 0
VOMITING: 0

## 2024-02-21 NOTE — PROGRESS NOTES
"Subjective:   Keo Almodovar is a 43 y.o. male who presents for Cough and Pharyngitis (Sx x on going , has been seen for same cc )      43-year-old male returns to urgent care, has been sick for around 2 weeks and over the last several days has had a persistent cough as well as a sore throat.  The cough is occasionally dry, intermittently with sputum production.  He feels slightly more rundown.  He is more postnasal drip and mucus production.  Was seen for pinkeye several days ago which is resolved    Review of Systems   Constitutional:  Positive for malaise/fatigue. Negative for chills and fever.   HENT:  Positive for congestion, sinus pain and sore throat. Negative for ear pain.    Eyes:  Negative for pain.   Respiratory:  Positive for cough and sputum production. Negative for shortness of breath.    Cardiovascular:  Negative for chest pain.   Gastrointestinal:  Negative for abdominal pain, constipation, diarrhea, nausea and vomiting.   Genitourinary:  Negative for dysuria.   Musculoskeletal:  Negative for myalgias.   Skin:  Negative for rash.   Neurological:  Negative for headaches.       Medications, Allergies, and current problem list reviewed today in Epic.     Objective:     /70 (BP Location: Left arm, Patient Position: Sitting, BP Cuff Size: Adult)   Pulse 68   Temp 36.6 °C (97.8 °F) (Temporal)   Resp 16   Ht 1.778 m (5' 10\")   Wt 88.5 kg (195 lb)   SpO2 95%     Physical Exam  Vitals reviewed.   Constitutional:       Appearance: Normal appearance.   HENT:      Head: Normocephalic and atraumatic.      Right Ear: Tympanic membrane, ear canal and external ear normal.      Left Ear: Tympanic membrane, ear canal and external ear normal.      Nose: Congestion and rhinorrhea present.      Mouth/Throat:      Mouth: Mucous membranes are moist.      Pharynx: No oropharyngeal exudate or posterior oropharyngeal erythema.      Comments: POST NASAL DRIP  Eyes:      Conjunctiva/sclera: Conjunctivae " normal.   Cardiovascular:      Rate and Rhythm: Normal rate and regular rhythm.   Pulmonary:      Effort: Pulmonary effort is normal.      Breath sounds: Normal breath sounds.   Musculoskeletal:      Cervical back: Normal range of motion.   Lymphadenopathy:      Cervical: No cervical adenopathy.   Skin:     General: Skin is warm and dry.      Capillary Refill: Capillary refill takes less than 2 seconds.   Neurological:      Mental Status: He is alert and oriented to person, place, and time.         Assessment/Plan:     Diagnosis and associated orders:     1. Bacterial sinusitis  amoxicillin-clavulanate (AUGMENTIN) 875-125 MG Tab      2. Cough, unspecified type  predniSONE (DELTASONE) 10 MG Tab    promethazine-dextromethorphan (PROMETHAZINE-DM) 6.25-15 MG/5ML syrup    benzonatate (TESSALON) 100 MG Cap         Comments/MDM:     Trial of prednisone cough suppressants.  Patient warned about sedating potential of the cough suppressant and to use carefully.  Recommend antihistamine and nasal steroid.  He could consider starting the antibiotic currently but he may just require use of the oral glucocorticoids to resolve his symptoms.  If failing to improve after 48 to 72 hours or worsening at any point recommend he trial the antibiotic.         Differential diagnosis, natural history, supportive care, and indications for immediate follow-up discussed.    Advised the patient to follow-up with the primary care physician for recheck, reevaluation, and consideration of further management.    Please note that this dictation was created using voice recognition software. I have made a reasonable attempt to correct obvious errors, but I expect that there are errors of grammar and possibly content that I did not discover before finalizing the note.    This note was electronically signed by Yohannes Winston PA-C

## 2024-03-22 ENCOUNTER — OFFICE VISIT (OUTPATIENT)
Dept: URGENT CARE | Facility: CLINIC | Age: 44
End: 2024-03-22
Payer: COMMERCIAL

## 2024-03-22 VITALS
BODY MASS INDEX: 27.2 KG/M2 | TEMPERATURE: 98.2 F | SYSTOLIC BLOOD PRESSURE: 108 MMHG | DIASTOLIC BLOOD PRESSURE: 66 MMHG | RESPIRATION RATE: 18 BRPM | HEART RATE: 72 BPM | WEIGHT: 190 LBS | HEIGHT: 70 IN | OXYGEN SATURATION: 97 %

## 2024-03-22 DIAGNOSIS — H61.20 CERUMEN IN AUDITORY CANAL ON EXAMINATION: ICD-10-CM

## 2024-03-22 DIAGNOSIS — J30.89 ENVIRONMENTAL AND SEASONAL ALLERGIES: Primary | ICD-10-CM

## 2024-03-22 PROCEDURE — 99213 OFFICE O/P EST LOW 20 MIN: CPT | Mod: 25

## 2024-03-22 PROCEDURE — 3078F DIAST BP <80 MM HG: CPT

## 2024-03-22 PROCEDURE — 3074F SYST BP LT 130 MM HG: CPT

## 2024-03-22 RX ORDER — DESONIDE 0.5 MG/G
OINTMENT TOPICAL
COMMUNITY
Start: 2024-01-26 | End: 2024-03-29

## 2024-03-22 RX ORDER — TRIAMCINOLONE ACETONIDE 1 MG/G
CREAM TOPICAL
COMMUNITY
Start: 2024-01-26 | End: 2024-03-29

## 2024-03-22 RX ORDER — TRIAMCINOLONE ACETONIDE 40 MG/ML
40 INJECTION, SUSPENSION INTRA-ARTICULAR; INTRAMUSCULAR ONCE
Status: COMPLETED | OUTPATIENT
Start: 2024-03-22 | End: 2024-03-22

## 2024-03-22 RX ORDER — KETOCONAZOLE 20 MG/ML
SHAMPOO TOPICAL
COMMUNITY
Start: 2024-01-27 | End: 2024-03-01

## 2024-03-22 RX ADMIN — TRIAMCINOLONE ACETONIDE 40 MG: 40 INJECTION, SUSPENSION INTRA-ARTICULAR; INTRAMUSCULAR at 18:31

## 2024-03-22 ASSESSMENT — ENCOUNTER SYMPTOMS
SORE THROAT: 0
DIARRHEA: 0
FEVER: 0
STRIDOR: 0
VOMITING: 0
ABDOMINAL PAIN: 0
EYE PAIN: 0
SPUTUM PRODUCTION: 0
PALPITATIONS: 0
SHORTNESS OF BREATH: 0
DIZZINESS: 0
CHILLS: 0
NAUSEA: 0
WHEEZING: 0
COUGH: 0
HEADACHES: 0
MYALGIAS: 0

## 2024-03-23 NOTE — PROGRESS NOTES
Subjective:   Keo Almodovar is a 43 y.o. male who presents for Seasonal Allergies (X2-3 days, watery eyes, and runny nose )          I introduced myself to the patient and informed them that I am a family nurse practitioner.    HPI:Keo is a 43-year-old male who comes in today c/o severe rhinitis, itchy watery eyes, sneezing. Onset was approximately 1 week ago.  Patient describes symptoms as constant.  He denies any other viral type symptoms.  He does endorse having severe seasonal allergies.  They describe the pain as none. Aggravating factors include going outside, especially when it is windy. Relieving factors include none. Treatments tried at home include Benadryl with poor effect. They describe their symptoms as moderate.      Review of Systems   Constitutional:  Negative for chills, fever and malaise/fatigue.   HENT:  Positive for congestion. Negative for ear pain and sore throat.         Positive for rhinitis and sneezing   Eyes:  Positive for discharge and redness. Negative for blurred vision and pain.   Respiratory:  Negative for cough, sputum production, shortness of breath, wheezing and stridor.    Cardiovascular:  Negative for chest pain and palpitations.   Gastrointestinal:  Negative for abdominal pain, diarrhea, nausea and vomiting.   Genitourinary:  Negative for dysuria.   Musculoskeletal:  Negative for myalgias.   Skin:  Negative for rash.   Neurological:  Negative for dizziness and headaches.       Medications: desonide  finasteride  lisinopril Tabs  triamcinolone acetonide  triamcinolone acetonide Crea     Allergies: [place] (diagnostic), Eggs, and Peanut (diagnostic)    Problem List: does not have a problem list on file.    Surgical History:  No past surgical history on file.    Past Social Hx:   reports that he has never smoked. He has never used smokeless tobacco. He reports current alcohol use. He reports that he does not use drugs.     Past Family Hx:   family history is  "not on file.     Problem list, medications, and allergies reviewed by myself today in Epic.   I have documented what I find to be significant in regards to past medical, social, family and surgical history  in my HPI or under PMH/PSH/FH review section, otherwise it is noncontributory     Objective:     /66   Pulse 72   Temp 36.8 °C (98.2 °F) (Temporal)   Resp 18   Ht 1.778 m (5' 10\")   Wt 86.2 kg (190 lb)   SpO2 97%   BMI 27.26 kg/m²     During this visit, appropriate PPE was worn, and hand hygiene was performed.    Physical Exam  Vitals reviewed.   Constitutional:       General: He is not in acute distress.     Appearance: Normal appearance. He is not ill-appearing or toxic-appearing.   HENT:      Head: Normocephalic and atraumatic.      Right Ear: Tympanic membrane, ear canal and external ear normal. There is no impacted cerumen.      Left Ear: Tympanic membrane, ear canal and external ear normal. There is no impacted cerumen.      Nose: Rhinorrhea present. No congestion. Rhinorrhea is clear.      Right Turbinates: Swollen and pale.      Left Turbinates: Swollen and pale.      Right Sinus: No maxillary sinus tenderness or frontal sinus tenderness.      Left Sinus: No maxillary sinus tenderness or frontal sinus tenderness.      Mouth/Throat:      Pharynx: Oropharynx is clear. No oropharyngeal exudate or posterior oropharyngeal erythema.   Eyes:      General: Lids are normal. Vision grossly intact. Gaze aligned appropriately. Allergic shiner present. No visual field deficit or scleral icterus.        Right eye: No foreign body or discharge.         Left eye: No foreign body or discharge.      Extraocular Movements: Extraocular movements intact.      Right eye: Normal extraocular motion.      Conjunctiva/sclera: Conjunctivae normal.      Pupils: Pupils are equal, round, and reactive to light.   Cardiovascular:      Rate and Rhythm: Normal rate and regular rhythm.      Heart sounds: Normal heart sounds. " No murmur heard.     No friction rub. No gallop.   Pulmonary:      Effort: Pulmonary effort is normal. No respiratory distress.      Breath sounds: Normal breath sounds. No stridor. No wheezing, rhonchi or rales.   Abdominal:      General: There is no distension.   Musculoskeletal:         General: Normal range of motion.      Cervical back: Normal range of motion. No rigidity.      Right lower leg: No edema.      Left lower leg: No edema.   Lymphadenopathy:      Cervical: No cervical adenopathy.   Skin:     General: Skin is warm and dry.      Coloration: Skin is not jaundiced.   Neurological:      General: No focal deficit present.      Mental Status: He is alert and oriented to person, place, and time. Mental status is at baseline.   Psychiatric:         Mood and Affect: Mood normal.         Behavior: Behavior normal.         Thought Content: Thought content normal.         Judgment: Judgment normal.         Assessment/Plan:     Diagnosis and associated orders:     1. Environmental and seasonal allergies  triamcinolone acetonide (Kenalog-40) injection 40 mg         Comments/MDM:     1. Environmental and seasonal allergies  Discussed with patient Dx,  DDx, management options (risks,benefits, and alternatives to planned treatment), natural progression.   Supportive care measures were discussed.   Questions were encouraged and answered.  Written information was provided and I did go over this with the patient in clinic today.  Discussed with patient Kenalog allergy shot in clinic to help alleviate symptoms, Flonase nasal spray and Zyrtec daily ongoing, instructed him regarding purpose, side effects, precautions.  States he has good understanding would like to go ahead with the Kenalog shot.  I did keep him in clinic for 10 minutes after the shot, he tolerated well with no adverse reactions.  Discussed with patient he should follow-up with PCP for further management of allergies, Kenalog shots may be administered 2-3  times per year for control of allergy symptoms.  Instructed patient regarding red flags and to return to urgent care prn if new or worsening sx or if there is no improvement in condition prn.    Advised the patient to follow-up with the primary care physician for recheck, reevaluation, and consideration of further management.  I did instruct patient regarding medications prescribed, purpose, side effects, precautions.  Instructed patient to get a pharmacy consult when picking up any prescribed medications.  Strict ER precautions discussed for any  chest pain, difficulty breathing, difficulty swallowing, wheezing, stridor, or drooling    Patient states they have good understanding and they are agreeable with the plan of care.     - triamcinolone acetonide (Kenalog-40) injection 40 mg    2. Cerumen in auditory canal on examination  Discussed with patient that there is some buildup of cerumen in bilateral ear canals, however I am able to see TMs which appear normal with good landmarks.  Discussed with him use of OTC Debrox 3-4 times a year to keep ear canals clear of cerumen.  He states he has good understanding.         Pt is clinically stable at today's acute urgent care visit. Vital signs are normal and reassuring.  No acute distress noted. Appropriate for outpatient management at this time.        I personally reviewed prior external notes and test results pertinent to today's visit.  I have independently reviewed and interpreted all diagnostics ordered during this urgent care acute visit.        Please note that this dictation was created using voice recognition software. I have made a reasonable attempt to correct obvious errors, but I expect that there are errors of grammar and possibly content that I did not discover before finalizing the note.    This note was electronically signed by Azam LUCERO, ABDIEL, JH, ARIN

## 2024-03-25 ASSESSMENT — VISUAL ACUITY: OU: 1

## 2024-03-25 ASSESSMENT — ENCOUNTER SYMPTOMS
EYE DISCHARGE: 1
BLURRED VISION: 0
EYE REDNESS: 1

## 2024-03-29 ENCOUNTER — OFFICE VISIT (OUTPATIENT)
Dept: URGENT CARE | Facility: CLINIC | Age: 44
End: 2024-03-29
Payer: COMMERCIAL

## 2024-03-29 VITALS
HEART RATE: 75 BPM | TEMPERATURE: 96 F | WEIGHT: 190 LBS | HEIGHT: 70 IN | SYSTOLIC BLOOD PRESSURE: 102 MMHG | BODY MASS INDEX: 27.2 KG/M2 | DIASTOLIC BLOOD PRESSURE: 74 MMHG | OXYGEN SATURATION: 98 % | RESPIRATION RATE: 16 BRPM

## 2024-03-29 DIAGNOSIS — J06.9 VIRAL URI WITH COUGH: ICD-10-CM

## 2024-03-29 DIAGNOSIS — J02.0 STREP PHARYNGITIS: Primary | ICD-10-CM

## 2024-03-29 LAB — S PYO DNA SPEC NAA+PROBE: DETECTED

## 2024-03-29 RX ORDER — AMOXICILLIN 500 MG/1
500 CAPSULE ORAL 2 TIMES DAILY
Qty: 20 CAPSULE | Refills: 0 | Status: SHIPPED | OUTPATIENT
Start: 2024-03-29 | End: 2024-04-08

## 2024-03-29 ASSESSMENT — ENCOUNTER SYMPTOMS
SWOLLEN GLANDS: 1
VOMITING: 0
SHORTNESS OF BREATH: 0
DIARRHEA: 0
FEVER: 0
WHEEZING: 0
SORE THROAT: 1
COUGH: 1
HEMOPTYSIS: 0
NAUSEA: 0

## 2024-03-29 NOTE — PROGRESS NOTES
Subjective:     Keo Almodovar is a 43 y.o. male who presents for Cough (X one week ) and Pharyngitis (X one week )      States he's had ongoing illness over the last month, that the current sympotms feel the same as previous with a prolonged cough and sore throat. His previous symptoms had resolved, stating they went to [place] and felt fine, and then became sick again upon returning back home. Sore throat is currently 2/10. Has taken a previously prescribed cough syrup x 3 night. His wife recently was positive for strep throat.    Cough  This is a new problem. The current episode started in the past 7 days. Associated symptoms include a sore throat. Pertinent negatives include no ear pain, fever, hemoptysis, shortness of breath or wheezing.   Pharyngitis   This is a new problem. The current episode started in the past 7 days. Associated symptoms include coughing and swollen glands. Pertinent negatives include no diarrhea, ear pain, shortness of breath or vomiting.       History reviewed. No pertinent past medical history.    History reviewed. No pertinent surgical history.    Social History     Socioeconomic History    Marital status:      Spouse name: Not on file    Number of children: Not on file    Years of education: Not on file    Highest education level: Not on file   Occupational History    Not on file   Tobacco Use    Smoking status: Never    Smokeless tobacco: Never   Vaping Use    Vaping Use: Never used   Substance and Sexual Activity    Alcohol use: Yes     Comment: 5    Drug use: Never    Sexual activity: Not on file   Other Topics Concern    Not on file   Social History Narrative    Not on file     Social Determinants of Health     Financial Resource Strain: Not on file   Food Insecurity: Not on file   Transportation Needs: Not on file   Physical Activity: Not on file   Stress: Not on file   Social Connections: Not on file   Intimate Partner Violence: Not on file   Housing  "Stability: Not on file        History reviewed. No pertinent family history.     Allergies   Allergen Reactions    [place] (Diagnostic)     Eggs     Peanut (Diagnostic)        Review of Systems   Constitutional:  Positive for malaise/fatigue. Negative for fever.   HENT:  Positive for sore throat. Negative for ear pain.    Respiratory:  Positive for cough. Negative for hemoptysis, shortness of breath and wheezing.    Gastrointestinal:  Negative for diarrhea, nausea and vomiting.   All other systems reviewed and are negative.       Objective:   /74   Pulse 75   Temp (!) 35.6 °C (96 °F) (Temporal)   Resp 16   Ht 1.778 m (5' 10\")   Wt 86.2 kg (190 lb)   SpO2 98%   BMI 27.26 kg/m²     Physical Exam  Vitals reviewed.   Constitutional:       General: He is not in acute distress.     Appearance: He is well-developed. He is not toxic-appearing.   HENT:      Head: Normocephalic and atraumatic.      Right Ear: External ear normal. There is impacted cerumen.      Left Ear: Tympanic membrane and external ear normal.      Nose: Rhinorrhea present.      Mouth/Throat:      Mouth: Mucous membranes are moist.      Pharynx: Posterior oropharyngeal erythema present. No oropharyngeal exudate.   Eyes:      Conjunctiva/sclera: Conjunctivae normal.   Cardiovascular:      Rate and Rhythm: Normal rate.   Pulmonary:      Effort: Pulmonary effort is normal. No respiratory distress.      Breath sounds: Normal breath sounds. No stridor. No wheezing, rhonchi or rales.   Musculoskeletal:      Cervical back: Neck supple.   Skin:     General: Skin is warm and dry.      Findings: No rash.   Neurological:      Mental Status: He is alert and oriented to person, place, and time.      GCS: GCS eye subscore is 4. GCS verbal subscore is 5. GCS motor subscore is 6.   Psychiatric:         Speech: Speech normal.         Behavior: Behavior normal.         Thought Content: Thought content normal.         Judgment: Judgment normal. "       Assessment/Plan:   1. Viral URI with cough    2. Strep pharyngitis  - POCT CEPHEID GROUP A STREP - PCR  - amoxicillin (AMOXIL) 500 MG Cap; Take 1 Capsule by mouth 2 times a day for 10 days.  Dispense: 20 Capsule; Refill: 0    Results for orders placed or performed in visit on 03/29/24   POCT CEPHEID GROUP A STREP - PCR   Result Value Ref Range    POC Group A Strep, PCR Detected (A) Not Detected, Invalid   Symptomatic care.  -Oral hydration and rest.   -Cough control: nonpharmacologic options for cough relief such as throat lozenges, hot tea, honey.  -Over the counter expectorant as directed; Guaifenesin (Mucinex).  -Tylenol or ibuprofen for pain and fever as directed.   -Warm salt water gargles.  -OTC Throat lozenges or spray (Cepacol).    Seek emergency medical care immediately for: Trouble breathing, persistent pain or pressure in the chest, confusion, inability to wake or stay awake, bluish lips or face, persistent tachycardia (fast heart rate), prolonged dizziness, persistent high grade fevers. Follow up for prolonged cough, persistent wheezing, persistent throat pain, difficulty swallowing, persistent fevers, leg swelling, or any other concerns. Follow up with your Primary Care Provider.     -Discussed viral etiology of cough, symptomatic care for cough, and S&S of PNA with follow up. Stable Vitals. He opted to not viral test. His wife had had strep, and his testing was also positive. Initiated antibiotic coverage.      Differential diagnosis, natural history, supportive care, and indications for immediate follow-up discussed.

## 2024-04-04 ENCOUNTER — OFFICE VISIT (OUTPATIENT)
Dept: URGENT CARE | Facility: CLINIC | Age: 44
End: 2024-04-04
Payer: COMMERCIAL

## 2024-04-04 VITALS
OXYGEN SATURATION: 95 % | HEART RATE: 67 BPM | RESPIRATION RATE: 14 BRPM | BODY MASS INDEX: 27.2 KG/M2 | TEMPERATURE: 98 F | HEIGHT: 70 IN | DIASTOLIC BLOOD PRESSURE: 76 MMHG | SYSTOLIC BLOOD PRESSURE: 126 MMHG | WEIGHT: 190 LBS

## 2024-04-04 DIAGNOSIS — H61.23 BILATERAL IMPACTED CERUMEN: ICD-10-CM

## 2024-04-04 PROCEDURE — 69210 REMOVE IMPACTED EAR WAX UNI: CPT | Performed by: PHYSICIAN ASSISTANT

## 2024-04-04 PROCEDURE — 3074F SYST BP LT 130 MM HG: CPT | Performed by: PHYSICIAN ASSISTANT

## 2024-04-04 PROCEDURE — 3078F DIAST BP <80 MM HG: CPT | Performed by: PHYSICIAN ASSISTANT

## 2024-04-04 NOTE — PROGRESS NOTES
"Subjective:   Keo Almodovar is a 43 y.o. male who presents for Cerumen Impaction (Wax build up, bilateral )     This is a very pleasant 43-year-old male who was seen on March 29 with strep pharyngitis noted to have significant cerumen impaction.  He was advised to use drops at home.  He has tried this for several days with no improvement.  He denies loss of hearing.  He denies pain.  He has had similar problems before.        Medications:  amoxicillin Caps  lisinopril Tabs    Allergies:             [place] (diagnostic), Eggs, and Peanut (diagnostic)    Surgical History:       No past surgical history on file.    Past Social Hx:  Keo Almodovar  reports that he has never smoked. He has never used smokeless tobacco. He reports current alcohol use. He reports that he does not use drugs.     Past Family Hx:   Keo Almodovar family history is not on file.       Problem list, medications, and allergies reviewed by myself today in Epic.     Objective:     /76 (BP Location: Right arm, Patient Position: Sitting, BP Cuff Size: Adult)   Pulse 67   Temp 36.7 °C (98 °F) (Temporal)   Resp 14   Ht 1.778 m (5' 10\")   Wt 86.2 kg (190 lb)   SpO2 95%   BMI 27.26 kg/m²     Physical Exam  Vitals and nursing note reviewed.   Constitutional:       General: He is not in acute distress.     Appearance: Normal appearance. He is not ill-appearing or toxic-appearing.   HENT:      Head: Normocephalic.      Right Ear: Hearing and external ear normal. No decreased hearing noted. No drainage, swelling or tenderness. There is impacted cerumen. No foreign body. Tympanic membrane is not injected, erythematous or bulging.      Left Ear: Hearing and external ear normal. No decreased hearing noted. No drainage, swelling or tenderness. No foreign body. Tympanic membrane is not injected, erythematous or bulging.      Ears:      Comments: Bilateral EACs 100% impacted with cerumen.  Post lavage and curette " EAC is clear bilaterally with no evidence of otitis media.     Nose: Congestion and rhinorrhea present.      Mouth/Throat:      Mouth: Mucous membranes are moist.      Pharynx: Oropharynx is clear. No oropharyngeal exudate or posterior oropharyngeal erythema.      Tonsils: No tonsillar exudate.   Eyes:      General:         Right eye: No discharge.         Left eye: No discharge.      Pupils: Pupils are equal, round, and reactive to light.   Cardiovascular:      Rate and Rhythm: Normal rate and regular rhythm.      Pulses: Normal pulses.      Heart sounds: Normal heart sounds.   Pulmonary:      Effort: Pulmonary effort is normal. No respiratory distress.      Breath sounds: Normal breath sounds. No stridor or decreased air movement. No wheezing, rhonchi or rales.   Musculoskeletal:      Cervical back: Neck supple. No rigidity.   Lymphadenopathy:      Cervical: No cervical adenopathy.   Skin:     General: Skin is warm.   Neurological:      Mental Status: He is alert.     Procedure: Cerumen Removal bilateral  Risks and benefits of procedure discussed  Cerumen removed with curette by myself and lavage after softening agent instilled  Patient tolerated well  Post procedure exam with clear canal and normal TM      Assessment/Plan:     Diagnosis and Associated Orders:     1. Bilateral impacted cerumen        Comments/MDM:  Bilateral EACs were impacted with cerumen. Ear lavage was performed, afterward impacted cerumen was removed with speculum and lighted curette by myself. Subsequently the tympanic membrane was visualized bilaterally with no evidence of otitis media.  Recommend over-the-counter Debrox drops for future cerumen impaction.  Okay to use generic.    I personally reviewed prior external notes and test results pertinent to today's visit. Supportive care, natural history, differential diagnoses, and indications for immediate follow-up discussed. Return to clinic or go to ED if symptoms worsen or persist.  Red  flag symptoms discussed.  Patient/Parent/Guardian voices understanding. Follow-up with your primary care provider in 3-5 days.  All side effects of medication discussed including allergic response, GI upset, tendon injury, rash, sedation etc    Please note that this dictation was created using voice recognition software. I have made a reasonable attempt to correct obvious errors, but I expect that there are errors of grammar and possibly content that I did not discover before finalizing the note.    This note was electronically signed by Anais Sheffield PA-C

## 2024-09-04 ENCOUNTER — OFFICE VISIT (OUTPATIENT)
Dept: URGENT CARE | Facility: CLINIC | Age: 44
End: 2024-09-04
Payer: COMMERCIAL

## 2024-09-04 VITALS
DIASTOLIC BLOOD PRESSURE: 100 MMHG | WEIGHT: 211.8 LBS | RESPIRATION RATE: 14 BRPM | HEART RATE: 86 BPM | BODY MASS INDEX: 30.32 KG/M2 | HEIGHT: 70 IN | OXYGEN SATURATION: 98 % | TEMPERATURE: 97.2 F | SYSTOLIC BLOOD PRESSURE: 162 MMHG
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
## 2024-09-04 DIAGNOSIS — S90.862A INSECT BITE OF LEFT FOOT, INITIAL ENCOUNTER: ICD-10-CM

## 2024-09-04 DIAGNOSIS — R03.0 ELEVATED BLOOD PRESSURE READING: ICD-10-CM

## 2024-09-04 PROCEDURE — 3080F DIAST BP >= 90 MM HG: CPT | Performed by: NURSE PRACTITIONER

## 2024-09-04 PROCEDURE — 99213 OFFICE O/P EST LOW 20 MIN: CPT | Performed by: NURSE PRACTITIONER

## 2024-09-04 PROCEDURE — 3077F SYST BP >= 140 MM HG: CPT | Performed by: NURSE PRACTITIONER

## 2024-09-04 RX ORDER — CEPHALEXIN 500 MG/1
500 CAPSULE ORAL 4 TIMES DAILY
Qty: 28 CAPSULE | Refills: 0 | Status: SHIPPED | OUTPATIENT
Start: 2024-09-04 | End: 2024-09-11

## 2024-09-04 RX ORDER — METHYLPREDNISOLONE 4 MG
TABLET, DOSE PACK ORAL
Qty: 21 TABLET | Refills: 0 | Status: SHIPPED | OUTPATIENT
Start: 2024-09-04

## 2024-09-04 RX ORDER — TRIAMCINOLONE ACETONIDE 1 MG/G
1 CREAM TOPICAL 2 TIMES DAILY
Qty: 28 G | Refills: 0 | Status: SHIPPED | OUTPATIENT
Start: 2024-09-04 | End: 2024-09-11

## 2024-09-04 RX ORDER — FINASTERIDE 1 MG/1
TABLET, FILM COATED ORAL
COMMUNITY

## 2024-09-04 ASSESSMENT — ENCOUNTER SYMPTOMS
FEVER: 0
CHILLS: 0

## 2024-09-04 NOTE — PATIENT INSTRUCTIONS
-Cold compresses.  -Elevate extremity for swelling.   -Over the counter antihistamine for itching, cetirizine (zyrtec).  -Nonsteroidal anti-inflammatory drugs (NSAIDs) for pain, Ibuprofen as directed.  -Watch for any signs of infection (redness, pus, pain, increased swelling or fever).     Follow up for signs of infection, red streaking, shortness of breath or wheezing, oral or facial swelling, rash, joint pain, or any other concerns.

## 2024-09-04 NOTE — PROGRESS NOTES
Subjective:     Keo Almodovar is a 43 y.o. male who presents for Foot Problem (Pain on sole of L foot, swelling, pt states was walking on barefoot on grass, possible scorpion x 2 days )      Presents for a possible bug bite to his left foot. States on Monday, he had been walking on grass and felt pain to bottom of foot. Had not noticed an injury or blood. Has swelling, and is itchy. Has applied topical hydrocortisone. States he had noticed a scorpion around the trash can at a later time.    Has reportedly not taken his BP medications in 2 days.      Foot Problem  This is a new problem. The current episode started in the past 7 days. The problem has been gradually worsening. Pertinent negatives include no chills or fever. The symptoms are aggravated by walking.       History reviewed. No pertinent past medical history.    History reviewed. No pertinent surgical history.    Social History     Socioeconomic History    Marital status:      Spouse name: Not on file    Number of children: Not on file    Years of education: Not on file    Highest education level: Not on file   Occupational History    Not on file   Tobacco Use    Smoking status: Never    Smokeless tobacco: Never   Vaping Use    Vaping status: Never Used   Substance and Sexual Activity    Alcohol use: Yes     Comment: 5    Drug use: Never    Sexual activity: Not on file   Other Topics Concern    Not on file   Social History Narrative    Not on file     Social Determinants of Health     Financial Resource Strain: Not on file   Food Insecurity: Not on file   Transportation Needs: Not on file   Physical Activity: Not on file   Stress: Not on file   Social Connections: Not on file   Intimate Partner Violence: Low Risk  (3/10/2023)    Received from [facility]    History of Abuse     History of Abuse: Denies   Housing Stability: Not on file        History reviewed. No pertinent family history.     Allergies   Allergen  "Reactions    [place] (Diagnostic)     Eggs     Peanut (Diagnostic)        Review of Systems   Constitutional:  Negative for chills and fever.   All other systems reviewed and are negative.       Objective:   BP (!) 162/100 (BP Location: Right arm, Patient Position: Sitting, BP Cuff Size: Adult long)   Pulse 86   Temp 36.2 °C (97.2 °F) (Temporal)   Resp 14   Ht 1.778 m (5' 10\")   Wt 96.1 kg (211 lb 12.8 oz)   SpO2 98%   BMI 30.39 kg/m²     Physical Exam  Vitals reviewed.   Constitutional:       General: He is not in acute distress.  Pulmonary:      Effort: Pulmonary effort is normal.   Feet:      Left foot:      Skin integrity: Erythema and warmth present. No ulcer, blister or skin breakdown.      Comments: Left foot: Localized erythema, proximately 3 cm. Central induration. No palpable abscess formation. No skin breakdown.   Skin:     General: Skin is warm and dry.      Capillary Refill: Capillary refill takes less than 2 seconds.      Findings: Erythema present.   Neurological:      Mental Status: He is alert and oriented to person, place, and time.         Assessment/Plan:   1. Insect bite of left foot, initial encounter  - methylPREDNISolone (MEDROL DOSEPAK) 4 MG Tablet Therapy Pack; Follow schedule on package instructions.  Dispense: 21 Tablet; Refill: 0  - triamcinolone acetonide (KENALOG) 0.1 % Cream; Apply 1 Application topically 2 times a day for 7 days.  Dispense: 28 g; Refill: 0  - cephALEXin (KEFLEX) 500 MG Cap; Take 1 Capsule by mouth 4 times a day for 7 days.  Dispense: 28 Capsule; Refill: 0    2. Elevated blood pressure reading  -Take BP medication as directed.  -Monitor BP, and follow up with PCP for persistent elevation.    -Cold compresses.  -Elevate extremity for swelling.   -Over the counter antihistamine for itching, cetirizine (zyrtec).  -Nonsteroidal anti-inflammatory drugs (NSAIDs) for pain, Ibuprofen as directed.  -Watch for any signs of infection (redness, pus, pain, increased swelling " or fever).     Follow up for increased signs of infection, red streaking, shortness of breath or wheezing, oral or facial swelling, rash, joint pain, or any other concerns.    Discussed likely insect (wasp), spider, or scorpion bite. No skin breakdown noted. Has increasing erythema. Discussed initial antihistamine reaction, with signs of secondary cellulitis. Advised close monitoring with follow up for persistent or worsening symptoms.      Differential diagnosis, natural history, supportive care, and indications for immediate follow-up discussed.

## 2024-09-08 PROBLEM — I10 HYPERTENSION: Status: ACTIVE | Noted: 2024-09-08

## 2024-09-08 PROBLEM — L65.9 ALOPECIA: Status: ACTIVE | Noted: 2024-09-08

## 2024-09-08 PROBLEM — G47.33 SEVERE OBSTRUCTIVE SLEEP APNEA-HYPOPNEA SYNDROME: Status: ACTIVE | Noted: 2023-03-12

## 2024-09-11 ENCOUNTER — OFFICE VISIT (OUTPATIENT)
Dept: URGENT CARE | Facility: CLINIC | Age: 44
End: 2024-09-11
Payer: COMMERCIAL

## 2024-09-11 VITALS
DIASTOLIC BLOOD PRESSURE: 70 MMHG | OXYGEN SATURATION: 96 % | HEART RATE: 82 BPM | RESPIRATION RATE: 16 BRPM | WEIGHT: 211 LBS | TEMPERATURE: 97.2 F | HEIGHT: 70 IN | BODY MASS INDEX: 30.21 KG/M2 | SYSTOLIC BLOOD PRESSURE: 124 MMHG

## 2024-09-11 DIAGNOSIS — J01.90 ACUTE BACTERIAL SINUSITIS: ICD-10-CM

## 2024-09-11 DIAGNOSIS — J02.9 PHARYNGITIS, UNSPECIFIED ETIOLOGY: ICD-10-CM

## 2024-09-11 DIAGNOSIS — B96.89 ACUTE BACTERIAL SINUSITIS: ICD-10-CM

## 2024-09-11 LAB — S PYO DNA SPEC NAA+PROBE: NOT DETECTED

## 2024-09-11 PROCEDURE — 87651 STREP A DNA AMP PROBE: CPT | Performed by: PHYSICIAN ASSISTANT

## 2024-09-11 PROCEDURE — 3078F DIAST BP <80 MM HG: CPT | Performed by: PHYSICIAN ASSISTANT

## 2024-09-11 PROCEDURE — 99213 OFFICE O/P EST LOW 20 MIN: CPT | Performed by: PHYSICIAN ASSISTANT

## 2024-09-11 PROCEDURE — 3074F SYST BP LT 130 MM HG: CPT | Performed by: PHYSICIAN ASSISTANT

## 2024-09-11 ASSESSMENT — ENCOUNTER SYMPTOMS
SHORTNESS OF BREATH: 0
MYALGIAS: 1
SINUS PAIN: 1
NAUSEA: 0
FEVER: 0
CHILLS: 0
VOMITING: 0
SORE THROAT: 1
HEADACHES: 1
COUGH: 1

## 2024-09-11 NOTE — PROGRESS NOTES
Subjective     Keo Almodovar is a 43 y.o. male who presents with Pharyngitis (Sinus pain, body aches x 8 days)    HPI:  Keo Almodovar is a 43 y.o. male who presents today for evaluation of sore throat.  Patient reports that his symptoms started approximately 8 days ago.  He has had sore throat that is worse in the mornings, sinus congestion, fatigue, headaches, body aches.  He has had some intermittent cough as well.  No measured fever during this time.  He says that the congestion is much worse and thicker in the mornings but he has some congestion and sinus pressure all throughout the day.        Review of Systems   Constitutional:  Positive for malaise/fatigue. Negative for chills and fever.   HENT:  Positive for congestion, sinus pain and sore throat.    Respiratory:  Positive for cough. Negative for shortness of breath.    Gastrointestinal:  Negative for nausea and vomiting.   Musculoskeletal:  Positive for myalgias.   Neurological:  Positive for headaches.           PMH:  has no past medical history on file.  MEDS:   Current Outpatient Medications:     finasteride (PROPECIA) 1 MG tablet, 1 tablet Orally Once a day for 90 days, Disp: , Rfl:     lisinopril (PRINIVIL) 10 MG Tab, , Disp: , Rfl:     methylPREDNISolone (MEDROL DOSEPAK) 4 MG Tablet Therapy Pack, Follow schedule on package instructions. (Patient not taking: Reported on 9/11/2024), Disp: 21 Tablet, Rfl: 0    triamcinolone acetonide (KENALOG) 0.1 % Cream, Apply 1 Application topically 2 times a day for 7 days. (Patient not taking: Reported on 9/11/2024), Disp: 28 g, Rfl: 0    cephALEXin (KEFLEX) 500 MG Cap, Take 1 Capsule by mouth 4 times a day for 7 days. (Patient not taking: Reported on 9/11/2024), Disp: 28 Capsule, Rfl: 0  ALLERGIES:   Allergies   Allergen Reactions    [place] (Diagnostic)     Eggs     Peanut (Diagnostic)      SURGHX: No past surgical history on file.  SOCHX:  reports that he has never smoked. He has never  used smokeless tobacco. He reports current alcohol use. He reports that he does not use drugs.  FH: Family history was reviewed, no pertinent findings to report        Objective     There were no vitals taken for this visit.     Physical Exam  Constitutional:       Appearance: He is well-developed.   HENT:      Head: Normocephalic and atraumatic.      Right Ear: Tympanic membrane, ear canal and external ear normal.      Left Ear: Tympanic membrane, ear canal and external ear normal.      Nose: Mucosal edema and congestion present. No rhinorrhea.      Mouth/Throat:      Lips: Pink.      Mouth: Mucous membranes are moist.      Pharynx: Oropharynx is clear.   Eyes:      Conjunctiva/sclera: Conjunctivae normal.      Pupils: Pupils are equal, round, and reactive to light.   Cardiovascular:      Rate and Rhythm: Normal rate and regular rhythm.      Heart sounds: Normal heart sounds. No murmur heard.  Pulmonary:      Effort: Pulmonary effort is normal.      Breath sounds: Normal breath sounds. No wheezing.   Musculoskeletal:      Cervical back: Normal range of motion.   Lymphadenopathy:      Cervical: No cervical adenopathy.   Skin:     General: Skin is warm and dry.      Capillary Refill: Capillary refill takes less than 2 seconds.   Neurological:      Mental Status: He is alert and oriented to person, place, and time.   Psychiatric:         Behavior: Behavior normal.         Judgment: Judgment normal.       POCT GROUP A STREP, PCR - Negative    Assessment & Plan       1. Pharyngitis, unspecified etiology  - POCT GROUP A STREP, PCR  PCR strep test negative.  Discussed with patient that symptoms of sore throat are likely secondary to postnasal drip.    2. Acute bacterial sinusitis  - amoxicillin-clavulanate (AUGMENTIN) 875-125 MG Tab; Take 1 Tablet by mouth 2 times a day for 10 days.  Dispense: 20 Tablet; Refill: 0  - OTC cold/flu medications  -Supportive care also discussed to include the use of saline nasal rinses, steam  inhalation, and the use of a cool-mist humidifier in the bedroom at night.  - PO fluids          Differential Diagnosis, natural history, and supportive care discussed. Return to the Urgent Care or follow up with your PCP if symptoms fail to resolve, or for any new or worsening symptoms. Emergency room precautions discussed. Patient and/or family appears understanding of information.                  Differential Diagnosis, natural history, and supportive care discussed. Return to the Urgent Care or follow up with your PCP if symptoms fail to resolve, or for any new or worsening symptoms. Emergency room precautions discussed. Patient and/or family appears understanding of information.

## 2024-11-05 ENCOUNTER — HOSPITAL ENCOUNTER (OUTPATIENT)
Facility: MEDICAL CENTER | Age: 44
End: 2024-11-05
Payer: COMMERCIAL

## 2024-11-05 ENCOUNTER — OFFICE VISIT (OUTPATIENT)
Dept: URGENT CARE | Facility: CLINIC | Age: 44
End: 2024-11-05
Payer: COMMERCIAL

## 2024-11-05 VITALS
DIASTOLIC BLOOD PRESSURE: 76 MMHG | TEMPERATURE: 97.1 F | HEIGHT: 70 IN | SYSTOLIC BLOOD PRESSURE: 132 MMHG | RESPIRATION RATE: 18 BRPM | BODY MASS INDEX: 28.63 KG/M2 | HEART RATE: 76 BPM | OXYGEN SATURATION: 94 % | WEIGHT: 200 LBS

## 2024-11-05 DIAGNOSIS — Z20.818 STREPTOCOCCUS EXPOSURE: ICD-10-CM

## 2024-11-05 DIAGNOSIS — J02.9 PHARYNGITIS, UNSPECIFIED ETIOLOGY: ICD-10-CM

## 2024-11-05 LAB — S PYO DNA SPEC NAA+PROBE: NOT DETECTED

## 2024-11-05 PROCEDURE — 3075F SYST BP GE 130 - 139MM HG: CPT

## 2024-11-05 PROCEDURE — 87651 STREP A DNA AMP PROBE: CPT

## 2024-11-05 PROCEDURE — 87070 CULTURE OTHR SPECIMN AEROBIC: CPT

## 2024-11-05 PROCEDURE — 99213 OFFICE O/P EST LOW 20 MIN: CPT

## 2024-11-05 PROCEDURE — 3078F DIAST BP <80 MM HG: CPT

## 2024-11-05 RX ORDER — AMOXICILLIN 500 MG/1
500 CAPSULE ORAL 2 TIMES DAILY
Qty: 20 CAPSULE | Refills: 0 | Status: SHIPPED | OUTPATIENT
Start: 2024-11-05 | End: 2024-11-15

## 2024-11-05 ASSESSMENT — ENCOUNTER SYMPTOMS
COUGH: 0
CHILLS: 1
FEVER: 0
SORE THROAT: 1

## 2024-11-05 NOTE — PROGRESS NOTES
Subjective:     CHIEF COMPLAINT  Chief Complaint   Patient presents with    Sore Throat     SORE THROAT X 2 DAYS , EXPOOSED TO STREP       HPI  Keo Almodovar is a very pleasant 43 y.o. male who presents with a sore throat, congestion, and chills.  Both of his kids recently tested positive for strep.  He has not had any known fevers.  He reports that his symptoms started 2 days ago, but worsened today.  He has not had a cough.  He has had pain with swallowing.      REVIEW OF SYSTEMS  Review of Systems   Constitutional:  Positive for chills and malaise/fatigue. Negative for fever.   HENT:  Positive for congestion and sore throat. Negative for ear pain.    Respiratory:  Negative for cough.        PAST MEDICAL HISTORY  Patient Active Problem List    Diagnosis Date Noted    Alopecia 09/08/2024    Hypertension 09/08/2024    Severe obstructive sleep apnea-hypopnea syndrome 03/12/2023       SURGICAL HISTORY  patient denies any surgical history    ALLERGIES  Allergies   Allergen Reactions    [place] (Diagnostic)     Eggs     Peanut (Diagnostic)        CURRENT MEDICATIONS  Home Medications       Reviewed by Kamila Moss PRajeshARajesh-CRajesh (Physician Assistant) on 11/05/24 at 1039  Med List Status: <None>     Medication Last Dose Status   finasteride (PROPECIA) 1 MG tablet Taking Active   lisinopril (PRINIVIL) 10 MG Tab Taking Active   methylPREDNISolone (MEDROL DOSEPAK) 4 MG Tablet Therapy Pack Not Taking Active                    SOCIAL HISTORY  Social History     Tobacco Use    Smoking status: Never    Smokeless tobacco: Never   Vaping Use    Vaping status: Never Used   Substance and Sexual Activity    Alcohol use: Yes     Comment: 5    Drug use: Never    Sexual activity: Not on file       FAMILY HISTORY  History reviewed. No pertinent family history.       Objective:     VITAL SIGNS: /76 (BP Location: Left arm, Patient Position: Sitting, BP Cuff Size: Adult long)   Pulse 76   Temp 36.2 °C (97.1 °F)  "(Temporal)   Resp 18   Ht 1.778 m (5' 10\")   Wt 90.7 kg (200 lb)   SpO2 94%   BMI 28.70 kg/m²     PHYSICAL EXAM  Physical Exam  Vitals reviewed.   Constitutional:       General: He is not in acute distress.     Appearance: Normal appearance. He is ill-appearing. He is not toxic-appearing.   HENT:      Head: Normocephalic and atraumatic.      Right Ear: Tympanic membrane, ear canal and external ear normal.      Left Ear: Tympanic membrane, ear canal and external ear normal.      Nose: Congestion present.      Mouth/Throat:      Mouth: Mucous membranes are moist.      Pharynx: Uvula midline. Pharyngeal swelling and posterior oropharyngeal erythema present. No oropharyngeal exudate.      Tonsils: No tonsillar exudate or tonsillar abscesses.   Eyes:      Conjunctiva/sclera: Conjunctivae normal.      Pupils: Pupils are equal, round, and reactive to light.   Cardiovascular:      Rate and Rhythm: Normal rate and regular rhythm.      Heart sounds: Normal heart sounds.   Pulmonary:      Effort: Pulmonary effort is normal. No respiratory distress.      Breath sounds: Normal breath sounds. No stridor. No wheezing, rhonchi or rales.   Skin:     General: Skin is warm and dry.      Coloration: Skin is not pale.      Findings: No rash.   Neurological:      General: No focal deficit present.      Mental Status: He is alert and oriented to person, place, and time.   Psychiatric:         Mood and Affect: Mood normal.         Assessment/Plan:     1. Pharyngitis, unspecified etiology  - POCT CEPHEID GROUP A STREP - PCR  - amoxicillin (AMOXIL) 500 MG Cap; Take 1 Capsule by mouth 2 times a day for 10 days.  Dispense: 20 Capsule; Refill: 0  - CULTURE THROAT; Future    2. Streptococcus exposure  - amoxicillin (AMOXIL) 500 MG Cap; Take 1 Capsule by mouth 2 times a day for 10 days.  Dispense: 20 Capsule; Refill: 0  - CULTURE THROAT; Future  -Warm salt water gargles as needed for sore throat  -Tylenol OTC as needed for pain  -Return to " clinic if symptoms worsen or fail to resolve      MDM/Comments:  Patient has stable vital signs and is non-toxic appearing.  Strep testing performed in office with negative results.  Given the patient's known close exposure to strep, I have sent a course of amoxicillin to the pharmacy while the patient's throat culture is pending.  Discussed supportive care measures with warm salt water gargles, rest, tylenol OTC as needed for pain, and maintaining adequate hydration. Patient demonstrated understanding of treatment plan and agreed to return to the clinic if symptoms worsen or fail to resolve.     Differential diagnosis, natural history, supportive care, and indications for immediate follow-up discussed. All questions answered. Patient agrees with the plan of care.    Follow-up as needed if symptoms worsen or fail to improve to PCP, Urgent care or Emergency Room.    I have personally reviewed prior external notes and test results pertinent to today's visit.  I have independently reviewed and interpreted all diagnostics ordered during this urgent care acute visit.   Discussed management options (risks,benefits, and alternatives to treatment). Pt expresses understanding and the treatment plan was agreed upon. Questions were encouraged and answered to pt's satisfaction.    Please note that this dictation was created using voice recognition software. I have made a reasonable attempt to correct obvious errors, but I expect that there are errors of grammar and possibly content that I did not discover before finalizing the note.

## 2024-11-08 LAB
BACTERIA SPEC RESP CULT: NORMAL
SIGNIFICANT IND 70042: NORMAL
SITE SITE: NORMAL
SOURCE SOURCE: NORMAL

## 2025-04-24 ENCOUNTER — PATIENT MESSAGE (OUTPATIENT)
Dept: RESEARCH | Facility: MEDICAL CENTER | Age: 45
End: 2025-04-24
Payer: COMMERCIAL

## 2025-05-15 DIAGNOSIS — Z00.6 CLINICAL TRIAL PARTICIPANT: ICD-10-CM

## 2025-07-02 ENCOUNTER — RESEARCH ENCOUNTER (OUTPATIENT)
Dept: RESEARCH | Facility: MEDICAL CENTER | Age: 45
End: 2025-07-02
Payer: COMMERCIAL

## 2025-07-07 ENCOUNTER — RESEARCH ENCOUNTER (OUTPATIENT)
Dept: RESEARCH | Facility: MEDICAL CENTER | Age: 45
End: 2025-07-07
Payer: COMMERCIAL

## 2025-07-07 DIAGNOSIS — Z00.6 RESEARCH STUDY PATIENT: Primary | ICD-10-CM

## 2025-07-19 ENCOUNTER — OFFICE VISIT (OUTPATIENT)
Dept: URGENT CARE | Facility: CLINIC | Age: 45
End: 2025-07-19
Payer: COMMERCIAL

## 2025-07-19 VITALS
HEIGHT: 70 IN | HEART RATE: 68 BPM | SYSTOLIC BLOOD PRESSURE: 126 MMHG | RESPIRATION RATE: 16 BRPM | TEMPERATURE: 97.5 F | WEIGHT: 200 LBS | OXYGEN SATURATION: 99 % | BODY MASS INDEX: 28.63 KG/M2 | DIASTOLIC BLOOD PRESSURE: 74 MMHG

## 2025-07-19 DIAGNOSIS — H02.846 SWELLING OF EYELID, LEFT: Primary | ICD-10-CM

## 2025-07-19 PROCEDURE — 3074F SYST BP LT 130 MM HG: CPT | Performed by: STUDENT IN AN ORGANIZED HEALTH CARE EDUCATION/TRAINING PROGRAM

## 2025-07-19 PROCEDURE — 99212 OFFICE O/P EST SF 10 MIN: CPT | Performed by: STUDENT IN AN ORGANIZED HEALTH CARE EDUCATION/TRAINING PROGRAM

## 2025-07-19 PROCEDURE — 3078F DIAST BP <80 MM HG: CPT | Performed by: STUDENT IN AN ORGANIZED HEALTH CARE EDUCATION/TRAINING PROGRAM

## 2025-07-19 RX ORDER — FLUOXETINE 10 MG/1
TABLET, FILM COATED ORAL
COMMUNITY
Start: 2025-06-18

## 2025-07-19 RX ORDER — CLOTRIMAZOLE AND BETAMETHASONE DIPROPIONATE 10; .64 MG/G; MG/G
1 CREAM TOPICAL
COMMUNITY
Start: 2025-04-24

## 2025-07-19 RX ORDER — FLUCONAZOLE 150 MG/1
TABLET ORAL
COMMUNITY
Start: 2025-04-24

## 2025-07-19 RX ORDER — ESCITALOPRAM OXALATE 5 MG/1
5 TABLET ORAL DAILY
COMMUNITY

## 2025-07-19 ASSESSMENT — VISUAL ACUITY: OU: 1

## 2025-07-19 NOTE — PROGRESS NOTES
"Subjective:   Keo Almodovar is a 44 y.o. male who presents for Eye Problem (left eye pain X 1 DAY )      HPI:  Provide male presents with left eye swelling for the past 1 day he denies any vision changes denies any eye ball pain.  He reports some mild swelling on his left upper eyelid and lower eyelid on the lateral aspect he denies any injury to the area.  He denies any discharge from the eye or redness in the eye he denies any itching.  Denies any skin changes    Review of Systems   HENT:          Left eye slightly swollen       Medications:    clotrimazole-betamethasone Crea  escitalopram  finasteride  fluconazole  fluoxetine  lisinopril Tabs    Allergies: [place] (diagnostic), Eggs, and Peanut (diagnostic)    Problem List: Keo Almodovar does not have any pertinent problems on file.    Surgical History:  No past surgical history on file.    Past Social Hx: Keo Almodovar  reports that he has never smoked. He has never used smokeless tobacco. He reports current alcohol use. He reports that he does not use drugs.     Past Family Hx:  Keo Almodovar family history is not on file.     Problem list, medications, and allergies reviewed by myself today in Epic.     Objective:     /74   Pulse 68   Temp 36.4 °C (97.5 °F)   Resp 16   Ht 1.778 m (5' 10\")   Wt 90.7 kg (200 lb)   SpO2 99%   BMI 28.70 kg/m²     Physical Exam  Constitutional:       Appearance: Normal appearance.   Eyes:      General: Lids are everted, no foreign bodies appreciated. Vision grossly intact. No allergic shiner.        Left eye: No foreign body, discharge or hordeolum.      Comments: Lateral portion of the left lower eyelid and left upper eyelid mildly edematous.  Slightly puffier than the other eye no skin changes no conjunctival injection or discharge noted.  No tenderness to palpation.  Lids everted no foreign bodies.  No change in vision.   Cardiovascular:      Rate and Rhythm: " Normal rate and regular rhythm.      Pulses: Normal pulses.      Heart sounds: Normal heart sounds.   Pulmonary:      Effort: Pulmonary effort is normal.      Breath sounds: Normal breath sounds.   Neurological:      Mental Status: He is alert.         Assessment/Plan:     Diagnosis and associated orders:     1. Swelling of eyelid, left           Comments/MDM:     1. Swelling of eyelid, left (Primary)  Unclear etiology of left eye swelling.  No evidence of any trauma though I believe bumping it or some irritation may have been the cause.  No overlying skin changes less likely any infectious etiology.  No evidence of any conjunctival pathologies.  Lids were everted no foreign bodies detected no changes in vision.  - Symptomatic management discussed with patient including alternating heat packs/warm compresses and ice packs.  As well as use of ibuprofen and any antihistamine.  - If any worsening symptoms any changes in vision any discharge from the eye or eye redness or eye itching.  Any significant swelling or any skin changes of that eye I would recommend reevaluation.  Patient is agreeable with watchful waiting at this time and will return with any changes in his symptoms           Differential diagnosis, natural history, supportive care, and indications for immediate follow-up discussed.    Advised the patient to follow-up with the primary care physician for recheck, reevaluation, and consideration of further management.    Please note that this dictation was created using voice recognition software. I have made a reasonable attempt to correct obvious errors, but I expect that there are errors of grammar and possibly content that I did not discover before finalizing the note.    Travis Ritter M.D.

## 2025-07-28 ENCOUNTER — RESULTS FOLLOW-UP (OUTPATIENT)
Dept: MEDICAL GROUP | Facility: PHYSICIAN GROUP | Age: 45
End: 2025-07-28
Payer: COMMERCIAL

## 2025-07-28 LAB
APOB+LDLR+PCSK9 GENE MUT ANL BLD/T: NOT DETECTED
BRCA1+BRCA2 DEL+DUP + FULL MUT ANL BLD/T: NOT DETECTED
MLH1+MSH2+MSH6+PMS2 GN DEL+DUP+FUL M: NOT DETECTED

## 2025-08-28 ENCOUNTER — OFFICE VISIT (OUTPATIENT)
Dept: URGENT CARE | Facility: CLINIC | Age: 45
End: 2025-08-28
Payer: COMMERCIAL

## 2025-08-28 VITALS
OXYGEN SATURATION: 98 % | RESPIRATION RATE: 16 BRPM | WEIGHT: 227 LBS | SYSTOLIC BLOOD PRESSURE: 126 MMHG | DIASTOLIC BLOOD PRESSURE: 84 MMHG | HEIGHT: 70 IN | BODY MASS INDEX: 32.5 KG/M2 | TEMPERATURE: 97.4 F | HEART RATE: 76 BPM

## 2025-08-28 DIAGNOSIS — J06.9 URI WITH COUGH AND CONGESTION: Primary | ICD-10-CM

## 2025-08-28 DIAGNOSIS — H61.23 BILATERAL IMPACTED CERUMEN: ICD-10-CM

## 2025-08-28 RX ORDER — HYDROXYZINE HYDROCHLORIDE 10 MG/1
TABLET, FILM COATED ORAL
COMMUNITY
Start: 2025-07-22

## 2025-08-28 RX ORDER — BENZONATATE 100 MG/1
100 CAPSULE ORAL 3 TIMES DAILY PRN
Qty: 30 CAPSULE | Refills: 0 | Status: SHIPPED | OUTPATIENT
Start: 2025-08-28

## 2025-08-28 RX ORDER — DEXTROMETHORPHAN HYDROBROMIDE AND PROMETHAZINE HYDROCHLORIDE 15; 6.25 MG/5ML; MG/5ML
5 SYRUP ORAL EVERY 6 HOURS PRN
Qty: 120 ML | Refills: 0 | Status: SHIPPED | OUTPATIENT
Start: 2025-08-28 | End: 2025-09-04

## 2025-08-28 ASSESSMENT — ENCOUNTER SYMPTOMS
SORE THROAT: 1
MYALGIAS: 1
FEVER: 0
HEMOPTYSIS: 0
SPUTUM PRODUCTION: 1
COUGH: 1
DIARRHEA: 1
VOMITING: 0
SINUS PAIN: 1